# Patient Record
Sex: MALE | Race: ASIAN | NOT HISPANIC OR LATINO | URBAN - METROPOLITAN AREA
[De-identification: names, ages, dates, MRNs, and addresses within clinical notes are randomized per-mention and may not be internally consistent; named-entity substitution may affect disease eponyms.]

---

## 2018-09-18 ENCOUNTER — EMERGENCY (EMERGENCY)
Facility: HOSPITAL | Age: 28
LOS: 1 days | Discharge: HOME | End: 2018-09-18
Admitting: EMERGENCY MEDICINE
Payer: MEDICAID

## 2018-09-18 VITALS
DIASTOLIC BLOOD PRESSURE: 82 MMHG | RESPIRATION RATE: 16 BRPM | HEART RATE: 80 BPM | SYSTOLIC BLOOD PRESSURE: 126 MMHG | TEMPERATURE: 98 F | OXYGEN SATURATION: 100 %

## 2018-09-18 PROCEDURE — 99283 EMERGENCY DEPT VISIT LOW MDM: CPT | Mod: 25

## 2018-09-18 RX ADMIN — Medication 1 TABLET(S): at 01:38

## 2018-09-18 NOTE — ED PROVIDER NOTE - PMH
Bipolar disorder    Diabetes mellitus    GERD (gastroesophageal reflux disease)    Hypothyroid    Mental retardation    Psychiatric disorder  Unspecified diagnosis

## 2018-09-18 NOTE — ED PROVIDER NOTE - CARE PLAN
Principal Discharge DX:	Otitis media  Assessment and plan of treatment:	pls rest, drink plenty of fluids, take your antibiotics as instructed, tylenol/motrin as needed, f/u with your pmd, return for any worsening s ymptoms or any other concerning symptoms

## 2018-09-18 NOTE — ED PROVIDER NOTE - OBJECTIVE STATEMENT
27 y/o male pmh dm type 2 presnets with c/o b/l ear pain x 2 days, denies any headaches, neck pain, cough, f/c/n/v/d, tinnitus, hearing loss, chest pain, sob, abdominal pain,urinary symptoms, numbness/weakness/tingling, recent travel, sick contact, social history, has not taken any meds for his symptoms

## 2018-09-18 NOTE — ED PROVIDER NOTE - PLAN OF CARE
pls rest, drink plenty of fluids, take your antibiotics as instructed, tylenol/motrin as needed, f/u with your pmd, return for any worsening s ymptoms or any other concerning symptoms

## 2018-10-13 ENCOUNTER — EMERGENCY (EMERGENCY)
Facility: HOSPITAL | Age: 28
LOS: 1 days | Discharge: HOME | End: 2018-10-13
Attending: EMERGENCY MEDICINE | Admitting: EMERGENCY MEDICINE
Payer: MEDICAID

## 2018-10-13 VITALS
SYSTOLIC BLOOD PRESSURE: 126 MMHG | RESPIRATION RATE: 16 BRPM | TEMPERATURE: 98 F | DIASTOLIC BLOOD PRESSURE: 84 MMHG | OXYGEN SATURATION: 99 % | HEART RATE: 113 BPM

## 2018-10-13 PROCEDURE — 99283 EMERGENCY DEPT VISIT LOW MDM: CPT | Mod: 25

## 2018-10-13 NOTE — ED ADULT TRIAGE NOTE - CHIEF COMPLAINT QUOTE
Pt walk in c/o Right Flank pain, for 2 weeks, intermittent with Nausea Vomiting x3 today. denies Abdominal Pain, Dysuria, blood in the urine/ stool

## 2018-10-14 VITALS
HEART RATE: 110 BPM | OXYGEN SATURATION: 100 % | DIASTOLIC BLOOD PRESSURE: 90 MMHG | TEMPERATURE: 98 F | RESPIRATION RATE: 18 BRPM | SYSTOLIC BLOOD PRESSURE: 129 MMHG

## 2018-10-14 LAB
ALBUMIN SERPL ELPH-MCNC: 4.9 G/DL — SIGNIFICANT CHANGE UP (ref 3.3–5)
ALP SERPL-CCNC: 69 U/L — SIGNIFICANT CHANGE UP (ref 40–120)
ALT FLD-CCNC: 55 U/L — HIGH (ref 4–41)
AMORPH CRY # UR COMP ASSIST: SIGNIFICANT CHANGE UP (ref 0–0)
APPEARANCE UR: SIGNIFICANT CHANGE UP
AST SERPL-CCNC: 30 U/L — SIGNIFICANT CHANGE UP (ref 4–40)
BACTERIA # UR AUTO: SIGNIFICANT CHANGE UP
BASOPHILS # BLD AUTO: 0.05 K/UL — SIGNIFICANT CHANGE UP (ref 0–0.2)
BASOPHILS NFR BLD AUTO: 0.3 % — SIGNIFICANT CHANGE UP (ref 0–2)
BILIRUB SERPL-MCNC: 0.8 MG/DL — SIGNIFICANT CHANGE UP (ref 0.2–1.2)
BILIRUB UR-MCNC: NEGATIVE — SIGNIFICANT CHANGE UP
BLOOD UR QL VISUAL: NEGATIVE — SIGNIFICANT CHANGE UP
BUN SERPL-MCNC: 11 MG/DL — SIGNIFICANT CHANGE UP (ref 7–23)
CALCIUM SERPL-MCNC: 9 MG/DL — SIGNIFICANT CHANGE UP (ref 8.4–10.5)
CHLORIDE SERPL-SCNC: 95 MMOL/L — LOW (ref 98–107)
CO2 SERPL-SCNC: 28 MMOL/L — SIGNIFICANT CHANGE UP (ref 22–31)
COLOR SPEC: YELLOW — SIGNIFICANT CHANGE UP
CREAT SERPL-MCNC: 1.02 MG/DL — SIGNIFICANT CHANGE UP (ref 0.5–1.3)
EOSINOPHIL # BLD AUTO: 0.29 K/UL — SIGNIFICANT CHANGE UP (ref 0–0.5)
EOSINOPHIL NFR BLD AUTO: 1.9 % — SIGNIFICANT CHANGE UP (ref 0–6)
GLUCOSE SERPL-MCNC: 244 MG/DL — HIGH (ref 70–99)
GLUCOSE UR-MCNC: >1000 — HIGH
HCT VFR BLD CALC: 42.3 % — SIGNIFICANT CHANGE UP (ref 39–50)
HGB BLD-MCNC: 14.2 G/DL — SIGNIFICANT CHANGE UP (ref 13–17)
IMM GRANULOCYTES # BLD AUTO: 0.05 # — SIGNIFICANT CHANGE UP
IMM GRANULOCYTES NFR BLD AUTO: 0.3 % — SIGNIFICANT CHANGE UP (ref 0–1.5)
KETONES UR-MCNC: SIGNIFICANT CHANGE UP
LEUKOCYTE ESTERASE UR-ACNC: NEGATIVE — SIGNIFICANT CHANGE UP
LYMPHOCYTES # BLD AUTO: 15 % — SIGNIFICANT CHANGE UP (ref 13–44)
LYMPHOCYTES # BLD AUTO: 2.34 K/UL — SIGNIFICANT CHANGE UP (ref 1–3.3)
MCHC RBC-ENTMCNC: 29.3 PG — SIGNIFICANT CHANGE UP (ref 27–34)
MCHC RBC-ENTMCNC: 33.6 % — SIGNIFICANT CHANGE UP (ref 32–36)
MCV RBC AUTO: 87.4 FL — SIGNIFICANT CHANGE UP (ref 80–100)
MONOCYTES # BLD AUTO: 1.34 K/UL — HIGH (ref 0–0.9)
MONOCYTES NFR BLD AUTO: 8.6 % — SIGNIFICANT CHANGE UP (ref 2–14)
NEUTROPHILS # BLD AUTO: 11.57 K/UL — HIGH (ref 1.8–7.4)
NEUTROPHILS NFR BLD AUTO: 73.9 % — SIGNIFICANT CHANGE UP (ref 43–77)
NITRITE UR-MCNC: NEGATIVE — SIGNIFICANT CHANGE UP
NRBC # FLD: 0 — SIGNIFICANT CHANGE UP
PH UR: 7.5 — SIGNIFICANT CHANGE UP (ref 5–8)
PLATELET # BLD AUTO: 252 K/UL — SIGNIFICANT CHANGE UP (ref 150–400)
PMV BLD: 11.7 FL — SIGNIFICANT CHANGE UP (ref 7–13)
POTASSIUM SERPL-MCNC: 3.7 MMOL/L — SIGNIFICANT CHANGE UP (ref 3.5–5.3)
POTASSIUM SERPL-SCNC: 3.7 MMOL/L — SIGNIFICANT CHANGE UP (ref 3.5–5.3)
PROT SERPL-MCNC: 8.5 G/DL — HIGH (ref 6–8.3)
PROT UR-MCNC: 50 — SIGNIFICANT CHANGE UP
RBC # BLD: 4.84 M/UL — SIGNIFICANT CHANGE UP (ref 4.2–5.8)
RBC # FLD: 12.5 % — SIGNIFICANT CHANGE UP (ref 10.3–14.5)
RBC CASTS # UR COMP ASSIST: SIGNIFICANT CHANGE UP (ref 0–?)
SODIUM SERPL-SCNC: 139 MMOL/L — SIGNIFICANT CHANGE UP (ref 135–145)
SP GR SPEC: 1.03 — SIGNIFICANT CHANGE UP (ref 1–1.04)
UROBILINOGEN FLD QL: NORMAL — SIGNIFICANT CHANGE UP
WBC # BLD: 15.64 K/UL — HIGH (ref 3.8–10.5)
WBC # FLD AUTO: 15.64 K/UL — HIGH (ref 3.8–10.5)
WBC UR QL: SIGNIFICANT CHANGE UP (ref 0–?)

## 2018-10-14 PROCEDURE — 71046 X-RAY EXAM CHEST 2 VIEWS: CPT | Mod: 26

## 2018-10-14 RX ORDER — ACETAMINOPHEN 500 MG
650 TABLET ORAL ONCE
Qty: 0 | Refills: 0 | Status: COMPLETED | OUTPATIENT
Start: 2018-10-14 | End: 2018-10-14

## 2018-10-14 RX ADMIN — Medication 30 MILLILITER(S): at 01:42

## 2018-10-14 RX ADMIN — Medication 650 MILLIGRAM(S): at 01:42

## 2018-10-14 NOTE — ED PROVIDER NOTE - PLAN OF CARE
You were seen in the emergency department for cough. You had blood work, the results of which are attached. Please follow up with your PMD in the next 2-3 days. Return to the emergency department for any questions or concerns

## 2018-10-14 NOTE — ED PROVIDER NOTE - CARE PLAN
Principal Discharge DX:	Cough  Assessment and plan of treatment:	You were seen in the emergency department for cough. You had blood work, the results of which are attached. Please follow up with your PMD in the next 2-3 days. Return to the emergency department for any questions or concerns

## 2018-10-14 NOTE — ED ADULT NURSE NOTE - OBJECTIVE STATEMENT
Patient received in room #8 c/o nausea/vomiting and lower back pain. Patient A&OX2-3 Hx. MR. Patient denies any pain or difficulty urinating. Patient denies any abdominal pain. VS as noted. 20G IV Placed in left ac, labs drawn and sent. Will monitor.

## 2018-10-14 NOTE — ED PROVIDER NOTE - PHYSICAL EXAMINATION
General: WDWN  HEENT: EOMI, Trachea midline.   Cardiac: Normal S1 and S2 w/ RRR. No MRG.  Pulmonary: CTA bilaterally. No increased WOB.   Abdominal: Soft, NTND  Neurologic: No focal sensory or motor deficits.  Musculoskeletal: No limited ROM.  Vascular: DP pulses in BL LE 2+ and equal. WWP  Skin: Color appropriate for race.   Psychiatric: Pleasant, cooperative.   Adithya Zavala, PGY-1

## 2018-10-14 NOTE — ED PROVIDER NOTE - ATTENDING CONTRIBUTION TO CARE
Dr. Dixon: I have personally seen and examined this patient at the bedside. I have fully participated in the care of this patient. I have reviewed all pertinent clinical information, including history, physical exam, plan and the Resident's note and agree except as noted. HPI above as by me. Unremarkable PE above as by me. DDX post tussive emesis. PLAN xr, reassess, dc.

## 2018-10-14 NOTE — ED PROVIDER NOTE - OBJECTIVE STATEMENT
28M PMH DM, GERD and developmental disability. Pt reports 2 episodes of vomiting tonight. Mother is also in ER tonight. As per mom, pt and her have had a cough for past week. Tonight he had 1 episode of post-tussive vomiting. 28M PMH DM, GERD and developmental disability. Pt reports 2 episodes of vomiting tonight. Mother is also in ER tonight. As per mom, pt and her have had a cough for past week. Tonight he had 1 episode of post-tussive vomiting.    01:40 Zack att: 28M h/o dm2, developmental disability c/o vomiting. Patient states 2 episodes of vomiting tonight and atraumatic low back pain. Denies f, abd pain, dysuria. Per mom 10A today patient had coughing spell then emesis x 1. Mom denies f, d, foreign travel. Mother in ER for dry cough as well. PMH above PSH x MED metformin, gabapentin, benzotropine

## 2018-10-14 NOTE — ED PROVIDER NOTE - CONSTITUTIONAL, MLM
normal... Well appearing, well nourished, awake, alert, oriented to person, place, time/situation and in no apparent distress. Ambulatory neg exertional dyspnea.

## 2018-11-19 ENCOUNTER — EMERGENCY (EMERGENCY)
Facility: HOSPITAL | Age: 28
LOS: 1 days | Discharge: HOME | End: 2018-11-19
Attending: EMERGENCY MEDICINE | Admitting: EMERGENCY MEDICINE
Payer: MEDICAID

## 2018-11-19 VITALS
OXYGEN SATURATION: 100 % | DIASTOLIC BLOOD PRESSURE: 83 MMHG | HEART RATE: 71 BPM | SYSTOLIC BLOOD PRESSURE: 124 MMHG | TEMPERATURE: 98 F | RESPIRATION RATE: 16 BRPM

## 2018-11-19 PROCEDURE — 99283 EMERGENCY DEPT VISIT LOW MDM: CPT | Mod: 25

## 2018-11-19 NOTE — ED ADULT TRIAGE NOTE - CHIEF COMPLAINT QUOTE
C/o right ear pain x 10 days as well as a stye to his right eye. H/O DM, GERD, intellectual disability.

## 2018-11-20 VITALS
OXYGEN SATURATION: 100 % | HEART RATE: 93 BPM | SYSTOLIC BLOOD PRESSURE: 117 MMHG | RESPIRATION RATE: 16 BRPM | TEMPERATURE: 98 F | DIASTOLIC BLOOD PRESSURE: 78 MMHG

## 2018-11-20 NOTE — ED PROVIDER NOTE - ATTENDING CONTRIBUTION TO CARE
MD Whitaker:  I performed a face to face bedside interview with patient regarding history of present illness, review of symptoms and past medical history. I completed an independent physical exam(documented below).  I have discussed patient's plan of care with resident.   I agree with note as stated above, having amended the EMR as needed to reflect my findings. I have discussed the assessment and plan of care.  This includes during the time I functioned as the attending physician for this patient.  PE:  Gen: Alert, NAD  Head: NC, AT,  EOMI, normal lids/conjunctiva  ENT:  normal hearing, patent oropharynx without erythema/exudate, R ear TM is full w/ peripheral rim of erythema  Neck: +supple, no tenderness/meningismus/JVD, +Trachea midline  Chest: no chest wall tenderness, equal chest rise  Pulm: Bilateral BS, normal resp effort, no wheeze/stridor/retractions  CV: RRR, no M/R/G, +dist pulses  Abd: +BS, soft, NT/ND  Rectal: deferred  Mskel: no edema/erythema/cyanosis  Skin: no rash  Neuro: AAOx3  MDM:  29yo M w/ pmh of GERD, DM, intellectual disability, multiple "ear infections" in the past, c/o R ear pain X 1wk which feels similar to prior ear infections and right eyelid swelling. Right eyelid swelling consistent w/ hordeolum, pt to use warm compresses for this. Right ear exam consistent w/ AOM, outpt abx and pmd f/u.

## 2018-11-20 NOTE — ED PROVIDER NOTE - NS ED ROS FT
GENERAL: no fever, chills  HEENT: + right ear pain, right eyelid stye  CARDIAC: no chest pain, palpitations  PULM: no shortness of breath, cough, wheezing   GI: no abdominal pain, nausea, vomiting, diarrhea, constipation  : no dysuria, frequency  NEURO: no headache, lightheadedness  MSK: no joint or muscle pain  SKIN: no rashes  HEME: no active bleeding

## 2018-11-20 NOTE — ED PROVIDER NOTE - OBJECTIVE STATEMENT
28M with hx of DM, GERD, intellectual disability presenting with right ear pain for ~ 1 week and right eyelid stye for a few days. Patient has had AOM in the past, and states that this feels similar. Denies hearing loss, tinnitus, blurry vision, headache, n/v, throat pain, congestion, pain with eye movements, fevers, chills. Denies drainage from ears. Endorses right eyelid discomfort.

## 2018-11-20 NOTE — ED PROVIDER NOTE - NSFOLLOWUPINSTRUCTIONS_ED_ALL_ED_FT
Your diagnosis: acute otitis media, eyelid stye    Discharge instructions:    1. Please follow-up with your Primary Care Doctor in 1-2 days.    2. Take any prescribed medications as instructed: Augmentin two times a day for 7 days    3. Others: Use warm compresses to treat eyelid stye.    4. Be sure to return to the ED if you develop new or worsening symptoms. Specific signs and symptoms to be vigilant of: fever or chills, chest pain, difficulty breathing, palpitations, loss of consciousness, headache, vision changes, slurred speech, difficulty swallowing or drooling, facial droop, weakness in the arms or legs, numbness or tingling, abdominal pain, nausea or vomiting, diarrhea, constipation, blood in the stool or urine, pain on urination, difficulty urinating.

## 2018-11-20 NOTE — ED PROVIDER NOTE - MEDICAL DECISION MAKING DETAILS
28M with hx of DM, GERD, intellectual disability presenting with right ear pain for ~ 1 week and right eyelid stye for a few days. Plan - treat as AOM with antibiotics.

## 2018-11-20 NOTE — ED PROVIDER NOTE - PHYSICAL EXAMINATION
GENERAL: NAD, non-toxic appearing  HEAD: NCAT  HEENT: right eyelid stye over medial aspect; EOMI intact, no pain on movement of eyes, PERRLA; right TM slightly erythematous  CARDIAC: RRR, no murmurs  PULM: CTAB, no crackles, rales, rhonchi, or wheezing  GI: Abdomen nondistended, soft, nontender, no guarding or rebound tenderness  NEURO: AAO x 3, no focal motor or sensory deficits  MSK: Moving 4 extremities, no visible deformities  SKIN: No visible rashes, dry, well-perfused  PYSCH: Appropriate mood and affect

## 2019-01-27 ENCOUNTER — EMERGENCY (EMERGENCY)
Facility: HOSPITAL | Age: 29
LOS: 1 days | Discharge: HOME | End: 2019-01-27
Attending: EMERGENCY MEDICINE | Admitting: EMERGENCY MEDICINE
Payer: MEDICAID

## 2019-01-27 VITALS
HEART RATE: 74 BPM | DIASTOLIC BLOOD PRESSURE: 80 MMHG | SYSTOLIC BLOOD PRESSURE: 115 MMHG | OXYGEN SATURATION: 100 % | TEMPERATURE: 98 F | RESPIRATION RATE: 16 BRPM

## 2019-01-27 PROCEDURE — 99282 EMERGENCY DEPT VISIT SF MDM: CPT | Mod: 25

## 2019-01-27 NOTE — ED PROVIDER NOTE - OBJECTIVE STATEMENT
28 yr old male with hx of Dm MR presents to ed c/o few days of right ear pain. no hearing loss, no discharge, no fever, no headache, no n/v, no tinnitus. pt here with mother

## 2019-01-27 NOTE — ED PROVIDER NOTE - MEDICAL DECISION MAKING DETAILS
pt here with right ear pain exam unremarkable.    earache,  encourage to use motrin.  no concern for infection

## 2019-01-27 NOTE — ED PROVIDER NOTE - ENMT, MLM
Airway patent, Nasal mucosa clear. Mouth with normal mucosa. Throat has no vesicles, no oropharyngeal exudates and uvula is midline. bilateral ear- normal lie, no deformity, no discharge, no pain at mastoid, tm intact, no pain with manipulation except mild discomfort at right

## 2020-06-12 ENCOUNTER — EMERGENCY (EMERGENCY)
Facility: HOSPITAL | Age: 30
LOS: 1 days | Discharge: HOME | End: 2020-06-12
Attending: EMERGENCY MEDICINE | Admitting: EMERGENCY MEDICINE
Payer: SELF-PAY

## 2020-06-12 VITALS
OXYGEN SATURATION: 98 % | DIASTOLIC BLOOD PRESSURE: 82 MMHG | RESPIRATION RATE: 20 BRPM | HEART RATE: 96 BPM | SYSTOLIC BLOOD PRESSURE: 119 MMHG | TEMPERATURE: 98 F

## 2020-06-12 PROCEDURE — 99283 EMERGENCY DEPT VISIT LOW MDM: CPT

## 2020-06-12 RX ORDER — BENZTROPINE MESYLATE 1 MG
1 TABLET ORAL
Qty: 20 | Refills: 0
Start: 2020-06-12 | End: 2020-06-21

## 2020-06-12 NOTE — ED ADULT TRIAGE NOTE - CHIEF COMPLAINT QUOTE
as per mother, he usually takes his haldol 5mg and cogentin 2mg x2 days one time in morning one time at night. today he only took his haldol and forgot to take the cogentin. he took it this afternoon. however he started shaking afterwards. " mother is also asking for a refill of cogentin prescription as patient ranout     mother denies that patient took extra medication. patient complain of shaking to entire body. patient is Nepali speaking, poor historian. as per mother, he usually takes his haldol 5mg and cogentin 2mg x2 days one time in morning one time at night. today he only took his haldol and forgot to take the cogentin. he took it this afternoon. however he started shaking afterwards. " mother is also asking for a refill of cogentin prescription as patient ran out     mother denies that patient took extra medication. patient complain of shaking to entire body. patient is Maltese speaking, poor historian. Mother # [phone]- also only Maltese speaking as per mother, he usually takes his haldol 5mg and cogentin 2mg x2 days one time in morning one time at night. today he only took his haldol and forgot to take the cogentin. he took it this afternoon. however he started shaking afterwards. " mother is also asking for a refill of cogentin prescription as patient ran out     patient gabriela has half of haldol amount left in medication however is out of cogentin. but mother denies that patient took extra medication. patient complain of shaking to entire body. patient is Maltese speaking, poor historian. Mother # [phone]- also only Maltese speaking

## 2020-06-12 NOTE — ED PROVIDER NOTE - OBJECTIVE STATEMENT
History was provided by patient's sister Ms Campo. 31 y/o male with a PMHx of MR, DM, and bipolar disorder. Pt normally take Haldol twice a day and Cogentin 2 mg twice a day. Pt ran out today and he took Haldol without Cogentin and started shaking. Took one last Cogentin at 3 pm and shaking did not resolve, therefore came into ED. He was shaking in triage as per family, but is no longer shaking now. No other complaints, no N/V. Of note pt is at baseline mentally as per family.

## 2020-06-12 NOTE — ED ADULT NURSE NOTE - CHIEF COMPLAINT QUOTE
as per mother, he usually takes his haldol 5mg and cogentin 2mg x2 days one time in morning one time at night. today he only took his haldol and forgot to take the cogentin. he took it this afternoon. however he started shaking afterwards. " mother is also asking for a refill of cogentin prescription as patient ran out     patient gabriela has half of haldol amount left in medication however is out of cogentin. but mother denies that patient took extra medication. patient complain of shaking to entire body. patient is Indonesian speaking, poor historian. Mother # [phone]- also only Indonesian speaking

## 2020-06-12 NOTE — ED PROVIDER NOTE - NSFOLLOWUPINSTRUCTIONS_ED_ALL_ED_FT
Return to ED for any worsening symptoms.  10 days Rx sent to pharmacy for cogentin 2mg twice a day.  Please call your doctor for a refill.

## 2020-06-12 NOTE — ED PROVIDER NOTE - PATIENT PORTAL LINK FT
You can access the FollowMyHealth Patient Portal offered by [facility] by registering at the following website: [email or web address]. By joining [employer]’s FollowMyHealth portal, you will also be able to view your health information using other applications (apps) compatible with our system.

## 2020-06-12 NOTE — ED PROVIDER NOTE - CONSTITUTIONAL, MLM
normal... Vitals stable. Following all commands. Well appearing, awake, alert, oriented to person, place, time/situation and in no apparent distress.

## 2020-06-12 NOTE — ED ADULT NURSE NOTE - OBJECTIVE STATEMENT
Pt. is awake, alert, cooperative and able to follow commands. No s/s of distress noted. Pt. has generalized shaking which was his only complaint. VSS. No SOB, respirations are even and unlabored. MD Frausto at bedside to assess. Pt. is awake, alert, cooperative and able to follow commands. No s/s of distress noted. Pt. has generalized shaking which was his only complaint. VSS. No SOB, respirations are even and unlabored. Abdomen is soft and non-tender. Pt. unable to verbalize why he ran out of medication. MD Frausto at bedside to assess.

## 2020-06-12 NOTE — ED PROVIDER NOTE - NEUROLOGICAL, MLM
+Not able to answer some basic questions. Alert and oriented. No tremors or abnormal movements noted.

## 2020-06-12 NOTE — ED PROVIDER NOTE - CLINICAL SUMMARY MEDICAL DECISION MAKING FREE TEXT BOX
29 y/o male with possible dyskinesia that has resolved upon arrival at ED; possibly due to Cogentin. Normal neuro exam will refill Cogentin for 1.5 week, until pt can get a refill from Doctor. Sister agreeable with plan Rx sent to [facility] pharmacy.

## 2022-10-19 NOTE — ED PROVIDER NOTE - NS ED MD DISPO DISCHARGE
Pt complaining of chest pain, that started today, rectal bleeding x 2 days and back pain x 1 week.
Home

## 2024-01-18 ENCOUNTER — EMERGENCY (EMERGENCY)
Facility: HOSPITAL | Age: 34
LOS: 1 days | Discharge: HOME | End: 2024-01-18
Attending: EMERGENCY MEDICINE | Admitting: EMERGENCY MEDICINE
Payer: MEDICAID

## 2024-01-18 VITALS
RESPIRATION RATE: 18 BRPM | OXYGEN SATURATION: 99 % | DIASTOLIC BLOOD PRESSURE: 91 MMHG | HEART RATE: 115 BPM | TEMPERATURE: 98 F | SYSTOLIC BLOOD PRESSURE: 126 MMHG

## 2024-01-18 LAB
ALBUMIN SERPL ELPH-MCNC: 4.2 G/DL — SIGNIFICANT CHANGE UP (ref 3.3–5)
ALP SERPL-CCNC: 113 U/L — SIGNIFICANT CHANGE UP (ref 40–120)
ALT FLD-CCNC: 47 U/L — HIGH (ref 4–41)
ANION GAP SERPL CALC-SCNC: 13 MMOL/L — SIGNIFICANT CHANGE UP (ref 7–14)
AST SERPL-CCNC: 31 U/L — SIGNIFICANT CHANGE UP (ref 4–40)
BASOPHILS # BLD AUTO: 0.02 K/UL — SIGNIFICANT CHANGE UP (ref 0–0.2)
BASOPHILS NFR BLD AUTO: 0.3 % — SIGNIFICANT CHANGE UP (ref 0–2)
BILIRUB SERPL-MCNC: 0.7 MG/DL — SIGNIFICANT CHANGE UP (ref 0.2–1.2)
BLOOD GAS VENOUS COMPREHENSIVE RESULT: SIGNIFICANT CHANGE UP
BUN SERPL-MCNC: 11 MG/DL — SIGNIFICANT CHANGE UP (ref 7–23)
CALCIUM SERPL-MCNC: 9.1 MG/DL — SIGNIFICANT CHANGE UP (ref 8.4–10.5)
CHLORIDE SERPL-SCNC: 98 MMOL/L — SIGNIFICANT CHANGE UP (ref 98–107)
CO2 SERPL-SCNC: 24 MMOL/L — SIGNIFICANT CHANGE UP (ref 22–31)
CREAT SERPL-MCNC: 0.92 MG/DL — SIGNIFICANT CHANGE UP (ref 0.5–1.3)
EGFR: 113 ML/MIN/1.73M2 — SIGNIFICANT CHANGE UP
EOSINOPHIL # BLD AUTO: 0.31 K/UL — SIGNIFICANT CHANGE UP (ref 0–0.5)
EOSINOPHIL NFR BLD AUTO: 5.2 % — SIGNIFICANT CHANGE UP (ref 0–6)
GLUCOSE SERPL-MCNC: 349 MG/DL — HIGH (ref 70–99)
HCT VFR BLD CALC: 37.4 % — LOW (ref 39–50)
HGB BLD-MCNC: 13.2 G/DL — SIGNIFICANT CHANGE UP (ref 13–17)
IANC: 3 K/UL — SIGNIFICANT CHANGE UP (ref 1.8–7.4)
IMM GRANULOCYTES NFR BLD AUTO: 0.2 % — SIGNIFICANT CHANGE UP (ref 0–0.9)
LIDOCAIN IGE QN: 37 U/L — SIGNIFICANT CHANGE UP (ref 7–60)
LYMPHOCYTES # BLD AUTO: 2.01 K/UL — SIGNIFICANT CHANGE UP (ref 1–3.3)
LYMPHOCYTES # BLD AUTO: 33.6 % — SIGNIFICANT CHANGE UP (ref 13–44)
MCHC RBC-ENTMCNC: 29.9 PG — SIGNIFICANT CHANGE UP (ref 27–34)
MCHC RBC-ENTMCNC: 35.3 GM/DL — SIGNIFICANT CHANGE UP (ref 32–36)
MCV RBC AUTO: 84.6 FL — SIGNIFICANT CHANGE UP (ref 80–100)
MONOCYTES # BLD AUTO: 0.63 K/UL — SIGNIFICANT CHANGE UP (ref 0–0.9)
MONOCYTES NFR BLD AUTO: 10.5 % — SIGNIFICANT CHANGE UP (ref 2–14)
NEUTROPHILS # BLD AUTO: 3 K/UL — SIGNIFICANT CHANGE UP (ref 1.8–7.4)
NEUTROPHILS NFR BLD AUTO: 50.2 % — SIGNIFICANT CHANGE UP (ref 43–77)
NRBC # BLD: 0 /100 WBCS — SIGNIFICANT CHANGE UP (ref 0–0)
NRBC # FLD: 0 K/UL — SIGNIFICANT CHANGE UP (ref 0–0)
PLATELET # BLD AUTO: 233 K/UL — SIGNIFICANT CHANGE UP (ref 150–400)
POTASSIUM SERPL-MCNC: 3.9 MMOL/L — SIGNIFICANT CHANGE UP (ref 3.5–5.3)
POTASSIUM SERPL-SCNC: 3.9 MMOL/L — SIGNIFICANT CHANGE UP (ref 3.5–5.3)
PROT SERPL-MCNC: 7.2 G/DL — SIGNIFICANT CHANGE UP (ref 6–8.3)
RBC # BLD: 4.42 M/UL — SIGNIFICANT CHANGE UP (ref 4.2–5.8)
RBC # FLD: 12 % — SIGNIFICANT CHANGE UP (ref 10.3–14.5)
SODIUM SERPL-SCNC: 135 MMOL/L — SIGNIFICANT CHANGE UP (ref 135–145)
WBC # BLD: 5.98 K/UL — SIGNIFICANT CHANGE UP (ref 3.8–10.5)
WBC # FLD AUTO: 5.98 K/UL — SIGNIFICANT CHANGE UP (ref 3.8–10.5)

## 2024-01-18 PROCEDURE — 99284 EMERGENCY DEPT VISIT MOD MDM: CPT

## 2024-01-18 PROCEDURE — 71046 X-RAY EXAM CHEST 2 VIEWS: CPT | Mod: 26

## 2024-01-18 PROCEDURE — 93010 ELECTROCARDIOGRAM REPORT: CPT

## 2024-01-18 RX ORDER — FAMOTIDINE 10 MG/ML
20 INJECTION INTRAVENOUS ONCE
Refills: 0 | Status: COMPLETED | OUTPATIENT
Start: 2024-01-18 | End: 2024-01-18

## 2024-01-18 RX ORDER — METOCLOPRAMIDE HCL 10 MG
10 TABLET ORAL ONCE
Refills: 0 | Status: COMPLETED | OUTPATIENT
Start: 2024-01-18 | End: 2024-01-18

## 2024-01-18 RX ORDER — SODIUM CHLORIDE 9 MG/ML
1000 INJECTION INTRAMUSCULAR; INTRAVENOUS; SUBCUTANEOUS ONCE
Refills: 0 | Status: COMPLETED | OUTPATIENT
Start: 2024-01-18 | End: 2024-01-18

## 2024-01-18 RX ADMIN — SODIUM CHLORIDE 1000 MILLILITER(S): 9 INJECTION INTRAMUSCULAR; INTRAVENOUS; SUBCUTANEOUS at 22:28

## 2024-01-18 RX ADMIN — Medication 10 MILLIGRAM(S): at 23:01

## 2024-01-18 RX ADMIN — FAMOTIDINE 20 MILLIGRAM(S): 10 INJECTION INTRAVENOUS at 23:01

## 2024-01-18 NOTE — ED PROVIDER NOTE - NSFOLLOWUPCLINICS_GEN_ALL_ED_FT
Gastroenterology at [facility]  Gastroenterology  [address] [place], [place] [address]  Phone: [phone]  Fax:   Follow Up Time: 7-10 Days

## 2024-01-18 NOTE — ED PROVIDER NOTE - PHYSICAL EXAMINATION
Attending/Jeremy: Well-appearing, NAD; PERRL/EOMI, non-icterus, +dry mucosa, supple, no BAO, no JVD, RRR, CTAB; Abd-soft, NT/ND, no HSM; no LE edema, A&Ox3, nonfocal; Skin-warm/dry

## 2024-01-18 NOTE — ED PROVIDER NOTE - PATIENT PORTAL LINK FT
You can access the FollowMyHealth Patient Portal offered by [facility] by registering at the following website: [email or web address]. By joining [employer]’s FollowMyHealth portal, you will also be able to view your health information using other applications (apps) compatible with our system.

## 2024-01-18 NOTE — ED PROVIDER NOTE - OBJECTIVE STATEMENT
Attending/Jeremy: 31-year-old male Hebrew speaking history of DM2, MR, bipolar disorder presents with 3 days of nausea/vomiting.  He denies fever/chills, abdominal pain, change in urinary/bowel habits, weakness or lightheadedness.    LL #: [id number] Osmar Smithiaz

## 2024-01-18 NOTE — ED ADULT NURSE NOTE - OBJECTIVE STATEMENT
Pt received to 27a a/o x 3 c/o nausea,vomiting x 1 day. Pt noted to have some blood in emesis. denies any new foods. Respirations even and unlabored. Lung sounds clear with equal chest rise bilaterally. ABD is soft, non tender, non distended with normal active bowel sounds.  LBM was yesterday and normal for him, no dark stools. No complaints of chest pain, headache, dizziness,   SOB, fever, chills verbalized. 20g iv placed left AC labs drawn and sent. medication given as per order. Report given to primary nurse.

## 2024-01-18 NOTE — ED PROVIDER NOTE - NSICDXPASTMEDICALHX_GEN_ALL_CORE_FT
PAST MEDICAL HISTORY:  Bipolar disorder     Diabetes mellitus     GERD (gastroesophageal reflux disease)     Hypothyroid     Mental retardation     Psychiatric disorder Unspecified diagnosis

## 2024-01-18 NOTE — ED PROVIDER NOTE - DISCHARGE DATE
IRENE TAYLOR  71y  Female    Patient is a 71y old  Female who presents with a chief complaint of Sepsis (16 Nov 2018 11:50)    awake and alert. scheduled for transfer to [facility] for ERCP by Dr. Lexa Sin.     PAST MEDICAL & SURGICAL HISTORY:  ESRD (end stage renal disease) on dialysis: MWF  CAD (coronary artery disease): s/p stent in 2007  Diabetes  Myocardial infarction  Hypertension  H/O: hysterectomy          PHYSICAL EXAM:    T(C): 37.2 (11-16-18 @ 12:44), Max: 37.7 (11-16-18 @ 00:47)  HR: 77 (11-16-18 @ 12:44) (74 - 89)  BP: 128/70 (11-16-18 @ 12:44) (128/70 - 176/74)  RR: 19 (11-16-18 @ 12:44) (18 - 19)  SpO2: 90% (11-16-18 @ 12:44) (90% - 100%)  Wt(kg): --    I&O's Detail    15 Nov 2018 07:01  -  16 Nov 2018 07:00  --------------------------------------------------------  IN:  Total IN: 0 mL    OUT:    Other: 2000 mL    Post-Void Residual per Intermittent Catheterization: 400 mL  Total OUT: 2400 mL    Total NET: -2400 mL      16 Nov 2018 07:01  -  16 Nov 2018 13:49  --------------------------------------------------------  IN:  Total IN: 0 mL    OUT:    Emesis: 200 mL  Total OUT: 200 mL    Total NET: -200 mL          Respiratory: clear anteriorly, decreased BS at bases  Cardiovascular: S1 S2  Gastrointestinal: soft NT ND +BS  Extremities: trace edema   Neuro: Awake and alert    MEDICATIONS  (STANDING):  aspirin enteric coated 81 milliGRAM(s) Oral daily  atorvastatin 40 milliGRAM(s) Oral at bedtime  calcium carbonate 1250 mG  + Vitamin D (OsCal 500 + D) 1 Tablet(s) Oral daily  chlorhexidine 2% Cloths 1 Application(s) Topical daily  clopidogrel Tablet 75 milliGRAM(s) Oral daily  dextrose 5%. 1000 milliLiter(s) (50 mL/Hr) IV Continuous <Continuous>  dextrose 50% Injectable 12.5 Gram(s) IV Push once  dextrose 50% Injectable 25 Gram(s) IV Push once  dextrose 50% Injectable 25 Gram(s) IV Push once  docusate sodium 100 milliGRAM(s) Oral three times a day  epoetin analilia Injectable 29276 Unit(s) IV Push <User Schedule>  ertapenem  IVPB      ferrous    sulfate 325 milliGRAM(s) Oral three times a day  gabapentin 300 milliGRAM(s) Oral daily  heparin  Injectable 5000 Unit(s) SubCutaneous every 8 hours  insulin lispro (HumaLOG) corrective regimen sliding scale   SubCutaneous Before meals and at bedtime  isosorbide   mononitrate ER Tablet (IMDUR) 60 milliGRAM(s) Oral <User Schedule>  isosorbide   mononitrate ER Tablet (IMDUR) 30 milliGRAM(s) Oral every 24 hours  metoprolol tartrate 50 milliGRAM(s) Oral two times a day  NIFEdipine XL 90 milliGRAM(s) Oral daily  pantoprazole    Tablet 40 milliGRAM(s) Oral before breakfast  polyethylene glycol 3350 17 Gram(s) Oral daily  senna 2 Tablet(s) Oral at bedtime  ursodiol Capsule 300 milliGRAM(s) Oral every 12 hours    MEDICATIONS  (PRN):  acetaminophen   Tablet .. 650 milliGRAM(s) Oral every 6 hours PRN Mild Pain (1 - 3)  dextrose 40% Gel 15 Gram(s) Oral once PRN Blood Glucose LESS THAN 70 milliGRAM(s)/deciliter  glucagon  Injectable 1 milliGRAM(s) IntraMuscular once PRN Glucose LESS THAN 70 milligrams/deciliter  simethicone 80 milliGRAM(s) Chew every 6 hours PRN abdominal bloating                            8.6    21.92 )-----------( 343      ( 16 Nov 2018 08:46 )             28.3       11-16    136  |  95<L>  |  22  ----------------------------<  157<H>  4.4   |  30  |  4.50<H>    Ca    8.1<L>      16 Nov 2018 08:46    TPro  7.4  /  Alb  2.0<L>  /  TBili  2.2<H>  /  DBili  1.80<H>  /  AST  127<H>  /  ALT  50  /  AlkPhos  466<H>  11-16 19-Jan-2024

## 2024-01-18 NOTE — ED PROVIDER NOTE - CLINICAL SUMMARY MEDICAL DECISION MAKING FREE TEXT BOX
A/P   33-year-old male nausea speaking history of DM2, MR, bipolar disorder presents with 3 days of nausea/vomiting with noted coffee-ground emesis.  He denies fever/chills, abdominal pain.  Exam noted for nontender and nondistended abdomen, dry mucosa.  Plan: Screening EKG, labs including rule out DKA, H2 blockers, IV fluids, reassess

## 2024-01-18 NOTE — ED PROVIDER NOTE - NSICDXFAMILYHX_GEN_ALL_CORE_FT
FAMILY HISTORY:  Family history of diabetes mellitus  Family history of hypertension  Family history unknown

## 2024-01-19 VITALS
DIASTOLIC BLOOD PRESSURE: 86 MMHG | HEART RATE: 99 BPM | SYSTOLIC BLOOD PRESSURE: 131 MMHG | RESPIRATION RATE: 18 BRPM | TEMPERATURE: 98 F | OXYGEN SATURATION: 99 %

## 2024-03-30 ENCOUNTER — EMERGENCY (EMERGENCY)
Facility: HOSPITAL | Age: 34
LOS: 1 days | Discharge: HOME | End: 2024-03-30
Attending: STUDENT IN AN ORGANIZED HEALTH CARE EDUCATION/TRAINING PROGRAM | Admitting: STUDENT IN AN ORGANIZED HEALTH CARE EDUCATION/TRAINING PROGRAM
Payer: MEDICAID

## 2024-03-30 VITALS
TEMPERATURE: 98 F | HEART RATE: 104 BPM | SYSTOLIC BLOOD PRESSURE: 115 MMHG | OXYGEN SATURATION: 100 % | RESPIRATION RATE: 18 BRPM | DIASTOLIC BLOOD PRESSURE: 78 MMHG

## 2024-03-30 PROCEDURE — 99285 EMERGENCY DEPT VISIT HI MDM: CPT

## 2024-03-30 NOTE — ED ADULT TRIAGE NOTE - CHIEF COMPLAINT QUOTE
Pt c/o chest pain, back pain, b/l rib cage pain x1 day. Hx DM2, mental retardation, hypothyroid, bipolar

## 2024-03-31 VITALS
HEART RATE: 99 BPM | SYSTOLIC BLOOD PRESSURE: 125 MMHG | OXYGEN SATURATION: 100 % | DIASTOLIC BLOOD PRESSURE: 89 MMHG | RESPIRATION RATE: 18 BRPM | TEMPERATURE: 98 F

## 2024-03-31 LAB
ALBUMIN SERPL ELPH-MCNC: 4 G/DL — SIGNIFICANT CHANGE UP (ref 3.3–5)
ALP SERPL-CCNC: 107 U/L — SIGNIFICANT CHANGE UP (ref 40–120)
ALT FLD-CCNC: 22 U/L — SIGNIFICANT CHANGE UP (ref 4–41)
ANION GAP SERPL CALC-SCNC: 11 MMOL/L — SIGNIFICANT CHANGE UP (ref 7–14)
APPEARANCE UR: CLEAR — SIGNIFICANT CHANGE UP
APTT BLD: 28.9 SEC — SIGNIFICANT CHANGE UP (ref 24.5–35.6)
AST SERPL-CCNC: 22 U/L — SIGNIFICANT CHANGE UP (ref 4–40)
BASOPHILS # BLD AUTO: 0.04 K/UL — SIGNIFICANT CHANGE UP (ref 0–0.2)
BASOPHILS NFR BLD AUTO: 0.7 % — SIGNIFICANT CHANGE UP (ref 0–2)
BILIRUB SERPL-MCNC: 0.5 MG/DL — SIGNIFICANT CHANGE UP (ref 0.2–1.2)
BILIRUB UR-MCNC: NEGATIVE — SIGNIFICANT CHANGE UP
BUN SERPL-MCNC: 5 MG/DL — LOW (ref 7–23)
CALCIUM SERPL-MCNC: 8.7 MG/DL — SIGNIFICANT CHANGE UP (ref 8.4–10.5)
CHLORIDE SERPL-SCNC: 95 MMOL/L — LOW (ref 98–107)
CO2 SERPL-SCNC: 26 MMOL/L — SIGNIFICANT CHANGE UP (ref 22–31)
COLOR SPEC: YELLOW — SIGNIFICANT CHANGE UP
CREAT SERPL-MCNC: 0.77 MG/DL — SIGNIFICANT CHANGE UP (ref 0.5–1.3)
D DIMER BLD IA.RAPID-MCNC: <150 NG/ML DDU — SIGNIFICANT CHANGE UP
DIFF PNL FLD: NEGATIVE — SIGNIFICANT CHANGE UP
EGFR: 122 ML/MIN/1.73M2 — SIGNIFICANT CHANGE UP
EOSINOPHIL # BLD AUTO: 0.41 K/UL — SIGNIFICANT CHANGE UP (ref 0–0.5)
EOSINOPHIL NFR BLD AUTO: 6.8 % — HIGH (ref 0–6)
FLUAV AG NPH QL: SIGNIFICANT CHANGE UP
FLUBV AG NPH QL: SIGNIFICANT CHANGE UP
GLUCOSE SERPL-MCNC: 304 MG/DL — HIGH (ref 70–99)
GLUCOSE UR QL: >=1000 MG/DL
HCT VFR BLD CALC: 38.5 % — LOW (ref 39–50)
HGB BLD-MCNC: 13.2 G/DL — SIGNIFICANT CHANGE UP (ref 13–17)
IANC: 3.22 K/UL — SIGNIFICANT CHANGE UP (ref 1.8–7.4)
IMM GRANULOCYTES NFR BLD AUTO: 0.2 % — SIGNIFICANT CHANGE UP (ref 0–0.9)
INR BLD: <0.9 RATIO — SIGNIFICANT CHANGE UP (ref 0.85–1.18)
KETONES UR-MCNC: NEGATIVE MG/DL — SIGNIFICANT CHANGE UP
LEUKOCYTE ESTERASE UR-ACNC: NEGATIVE — SIGNIFICANT CHANGE UP
LIDOCAIN IGE QN: 71 U/L — HIGH (ref 7–60)
LYMPHOCYTES # BLD AUTO: 1.51 K/UL — SIGNIFICANT CHANGE UP (ref 1–3.3)
LYMPHOCYTES # BLD AUTO: 25 % — SIGNIFICANT CHANGE UP (ref 13–44)
MAGNESIUM SERPL-MCNC: 1.8 MG/DL — SIGNIFICANT CHANGE UP (ref 1.6–2.6)
MCHC RBC-ENTMCNC: 29 PG — SIGNIFICANT CHANGE UP (ref 27–34)
MCHC RBC-ENTMCNC: 34.3 GM/DL — SIGNIFICANT CHANGE UP (ref 32–36)
MCV RBC AUTO: 84.6 FL — SIGNIFICANT CHANGE UP (ref 80–100)
MONOCYTES # BLD AUTO: 0.86 K/UL — SIGNIFICANT CHANGE UP (ref 0–0.9)
MONOCYTES NFR BLD AUTO: 14.2 % — HIGH (ref 2–14)
NEUTROPHILS # BLD AUTO: 3.22 K/UL — SIGNIFICANT CHANGE UP (ref 1.8–7.4)
NEUTROPHILS NFR BLD AUTO: 53.1 % — SIGNIFICANT CHANGE UP (ref 43–77)
NITRITE UR-MCNC: NEGATIVE — SIGNIFICANT CHANGE UP
NRBC # BLD: 0 /100 WBCS — SIGNIFICANT CHANGE UP (ref 0–0)
NRBC # FLD: 0 K/UL — SIGNIFICANT CHANGE UP (ref 0–0)
PH UR: 7 — SIGNIFICANT CHANGE UP (ref 5–8)
PLATELET # BLD AUTO: 236 K/UL — SIGNIFICANT CHANGE UP (ref 150–400)
POTASSIUM SERPL-MCNC: 3.7 MMOL/L — SIGNIFICANT CHANGE UP (ref 3.5–5.3)
POTASSIUM SERPL-SCNC: 3.7 MMOL/L — SIGNIFICANT CHANGE UP (ref 3.5–5.3)
PROT SERPL-MCNC: 6.8 G/DL — SIGNIFICANT CHANGE UP (ref 6–8.3)
PROT UR-MCNC: NEGATIVE MG/DL — SIGNIFICANT CHANGE UP
PROTHROM AB SERPL-ACNC: 10 SEC — SIGNIFICANT CHANGE UP (ref 9.5–13)
RBC # BLD: 4.55 M/UL — SIGNIFICANT CHANGE UP (ref 4.2–5.8)
RBC # FLD: 11.7 % — SIGNIFICANT CHANGE UP (ref 10.3–14.5)
RSV RNA NPH QL NAA+NON-PROBE: SIGNIFICANT CHANGE UP
SARS-COV-2 RNA SPEC QL NAA+PROBE: SIGNIFICANT CHANGE UP
SODIUM SERPL-SCNC: 132 MMOL/L — LOW (ref 135–145)
SP GR SPEC: 1.01 — SIGNIFICANT CHANGE UP (ref 1–1.03)
TROPONIN T, HIGH SENSITIVITY RESULT: <6 NG/L — SIGNIFICANT CHANGE UP
UROBILINOGEN FLD QL: 0.2 MG/DL — SIGNIFICANT CHANGE UP (ref 0.2–1)
WBC # BLD: 6.05 K/UL — SIGNIFICANT CHANGE UP (ref 3.8–10.5)
WBC # FLD AUTO: 6.05 K/UL — SIGNIFICANT CHANGE UP (ref 3.8–10.5)

## 2024-03-31 PROCEDURE — 93010 ELECTROCARDIOGRAM REPORT: CPT

## 2024-03-31 PROCEDURE — 71046 X-RAY EXAM CHEST 2 VIEWS: CPT | Mod: 26

## 2024-03-31 RX ORDER — SODIUM CHLORIDE 9 MG/ML
1000 INJECTION INTRAMUSCULAR; INTRAVENOUS; SUBCUTANEOUS ONCE
Refills: 0 | Status: COMPLETED | OUTPATIENT
Start: 2024-03-31 | End: 2024-03-31

## 2024-03-31 RX ORDER — ACETAMINOPHEN 500 MG
1000 TABLET ORAL ONCE
Refills: 0 | Status: COMPLETED | OUTPATIENT
Start: 2024-03-31 | End: 2024-03-31

## 2024-03-31 RX ADMIN — SODIUM CHLORIDE 1000 MILLILITER(S): 9 INJECTION INTRAMUSCULAR; INTRAVENOUS; SUBCUTANEOUS at 01:24

## 2024-03-31 RX ADMIN — Medication 400 MILLIGRAM(S): at 01:24

## 2024-03-31 NOTE — ED ADULT NURSE NOTE - OBJECTIVE STATEMENT
Break coverage RN: 34 y/o male, a&ox4, ambulatory, received to rm 6A. Pt c/o left sided chest pain, radiating to the left arm, and both sides of his back. Pt is a poor historian, unsure how long the pain has been going on. Skin intact, no signs of injury or deformities noted to upper extremities. Past medical history of MR, DM type 2, non-insulin dependent, and bipolar disorder. Denies CP, SOB, or dyspnea at this time. Respirations are even and unlabored, no signs of respiratory distress. 20GIV placed to right forearm, labs collected and sent off. Pt medicated as per MD orders.

## 2024-03-31 NOTE — ED ADULT NURSE NOTE - PAIN: PRESENCE, MLM
Dyspnea





- HISTORIAN


Historian: patient





- HPI


Stated Complaint: soa


Chief Complaint: Dyspnea


Additional Information: 





Patient presents to ED with a 2 day history of increasing shortness of breath 

and cough with yellow/green sputum production.  Patient wears oxygen (2 liters) 

at night.  He has not had to wear his oxygen during the day.  Patient reports 

some substernal chest pain when he gets really short of breath.  He has been u

sing his inhalers more than usual with little relief.  Denies fever, chills, 

night sweats or syncope.  Upon arrival to ED SaO2 is 86% on room air.  Patient 

has a history of bradycardia (s/p pacer), cardiomyopathy LVEF 25%, CAD, COPD, 

cigarette use.  Patient reports having Cardiac cath about 2 weeks ago showing 

nonoccluisve CAD.  


Onset: days ago (2)


Duration: continues in ED


Initiating Event: upper respiratory illness


Severity: moderate


Exacerbated By: coughing


Associated Symptoms: chest pain, productive cough.  denies: chills, fever, calf 

pain





- ROS


CONST: no problems


EYES/ENT: denies: sore throat, nasal drainage


GI/: denies: abdominal pain, vomiting, nausea, diarrhea


NEURO/PSYCH: denies: headache


MS/SKIN/LYMPH: denies: muscle aches





- PAST HX


Lung Disease: COPD


Cardiac Disease: CHF (LVEF 25%), CAD, other (pacemaker)


PE Risk Factors: hypertension.  denies: hx of PE


Surgeries/Procedures: denies: prior intubation


Allergies/Adverse Reactions: 


                                    Allergies











Allergy/AdvReac Type Severity Reaction Status Date / Time


 


No Known Drug Allergies Allergy   Verified 11/23/18 23:37














Home Medications: 


                                Ambulatory Orders











 Medication  Instructions  Recorded


 


Carvedilol [Coreg] 3.125 mg PO DAILY 08/03/16


 


Diclofenac Sodium [Voltaren] 50 mg PO BID 05/25/18


 


Lisinopril 5 mg PO DAILY 05/25/18


 


Omeprazole 20 mg PO DAILY 05/25/18


 


Ipratropium/Albuterol Sulfate 3 ml IH TID #90 ampul.neb 11/24/18





[Duoneb]  


 


Nebulizer [Aeroeclipse II] 1 each MC DAILY #1 each 11/24/18














- SOCIAL HX


Smoking History: cigarettes, less than 1 pack/day


Alcohol Use: none


Drug Use: none





- FAMILY HX


Family History: none





- VITAL SIGNS


Vital Signs: 





                                   Vital Signs











Temp Pulse Resp BP Pulse Ox


 


          110/68    


 


          05/25/18 15:15   














- REVIEWED ASSESSMENTS


Nursing Assessment  Reviewed: Yes


Vitals Reviewed: Yes





Progress





- Progress


Progress: 





015  Discussed CXR results and elevated WBC with patient.  Patient agrees to 

admission.  





- EKG/XRAY/CT


Comments: sinus rhythm 99 bpm left bbb





ED Results Lab/Radiology





- Radiology


Radiology Impressions: 





Portable chest 


Clinical history:  Shortness of breath for 2 days. 


Findings:  Examination of the chest in single portable AP view with comparison 

to examination of 05/25/2018 demonstrates lungs to be hyperinflated but clear.  

Bipolar pacemaker overlies left hemithorax.  Central pulmonary arteries are 

prominent suggesting pulmonary arterial hypertension. 


Impression: 


1. Emphysema. 


2. Prominence of central pulmonary arteries suggesting pulmonary arterial 

hypertension.


 


Electronically signed on Nov 23, 2018 11:58:33 PM CST by:


Bijan Mills





My interpretation is bilateral pneumonia, especially with elevated WBC and 

copious green sputum production. 





- Orders


Orders: 





                                    ED Orders











 Category Date Time Status


 


 Place IV Lock 1T Care  11/23/18 23:24 Ordered


 


 CHEST 1VIEW [RAD] Stat Exams  11/23/18 Ordered


 


 CBC/PLATELET/DIFF Routine Lab  11/23/18 Ordered


 


 CMP Routine Lab  11/23/18 Ordered


 


 NT-proBNP Stat Lab  11/23/18 Ordered


 


 TROPONIN I (cTnI) Stat Lab  11/23/18 Ordered


 


 Ipratropium/Albuterol Sulfate [Duoneb] Med  11/23/18 23:25 Once





 3 ml NEB NOW ONE   


 


 EKG WITH COMPARISON Stat Ther  11/23/18 Ordered














Dyspnea Physical Exam





- EXAM


General Appearance: no acute distress, alert


EENT: JOSE


Neck: No: lymphadenopathy


Respiratory: no resp. distress, speaks full sentences, other (markedly 

diminished breath sounds bilaterally)


CVS: reg. rate & rhythm, no murmur


Abdomen: non-tender.  No: tenderness


Skin: color nml, no rash


Extremities: non-tender, no edema


Neuro/Psych: oriented x3, motor nml





Discharge


Clincal Impression: 


 Acute respiratory failure with hypoxia





Bilateral pneumonia


Qualifiers:


 Pneumonia type: due to unspecified organism Lung location: lower lobe of lung 

Qualified Code(s): J18.1 - Lobar pneumonia, unspecified organism





Prescriptions: 


Ipratropium/Albuterol Sulfate [Duoneb] 3 ml IH TID #90 ampul.neb


Nebulizer [Aeroeclipse II] 1 each MC DAILY #1 each


Referrals: 


Griselda Dong PRN [Primary Care Provider] - 2 Days


Condition: Stable


Disposition: 09 ADMITTED AS INPATIENT


Decision to Admit: [id number]


Date of Decison to Admit: 11/24/18


Decision Time: 00:35
complains of pain/discomfort

## 2024-03-31 NOTE — ED PROVIDER NOTE - ATTENDING CONTRIBUTION TO CARE
33-year-old male, history of intellectual disability and type 2 diabetes, presenting with chief complaint of chest pain.  Ongoing for 2 to 3 weeks. nonradiating.  Has associated pain over his back, diffusely.  Denies any neurologic symptoms generally.  The history is limited because the patient does not seem to understand my questions secondary to his intellectual disability.  His mother does accompany him, [facility]  used to communicate with her.  However she also has somewhat tangential responses to questions from the .  No known allergies to medications.  He is on an oral antihyperglycemic which they cannot specify.  To the best of the mother's knowledge, the son does not use IV drugs, does not have a history of back surgeries.\  agree with above: pt complaining of lower back pain and whole body pain including left chest. had vomiting yesterday. has been ongoing for several days but has not taken anything for pain, no fevers, chills, abd pain, urinary sx, cough or fevers.   pt well appearing. no abd tenderness, lungs clear,   ekg nsr @96, no acute ischemic changes   pt improved with tylenol, likely msk pain,   will check labs, trop, cxr for chest pain, ua ro UTI with lower back pain

## 2024-03-31 NOTE — ED PROVIDER NOTE - PATIENT PORTAL LINK FT
You can access the FollowMyHealth Patient Portal offered by [facility] by registering at the following website: [email or web address]. By joining [facility]’s FollowMyHealth portal, you will also be able to view your health information using other applications (apps) compatible with our system.

## 2024-03-31 NOTE — ED PROVIDER NOTE - PHYSICAL EXAMINATION
Gen: NAD, non-toxic appearing  Head: normal appearing  HEENT: normal conjunctiva  Lung: no respiratory distress, speaking in full sentences, ctab   CV: regular rate and rhythm, no murmurs  Abd: soft, non distended, non tender   MSK: no visible deformities,  No swelling or calf tenderness.  Neuro: alert and grossly oriented,  he has intact strength throughout his lower extremities, he stands without difficulty.  Palpation over the back does not demonstrate any midline tenderness.  The soft tissue over the back does not look visually unremarkable.  Skin: No kristi rashes

## 2024-03-31 NOTE — ED PROVIDER NOTE - OBJECTIVE STATEMENT
limited hx, [facility]  used [id number]    33-year-old male, history of intellectual disability and type 2 diabetes, presenting with chief complaint of chest pain.  Ongoing for 2 to 3 weeks. nonradiating.  Has associated pain over his back, diffusely.  Denies any neurologic symptoms generally.  The history is limited because the patient does not seem to understand my questions secondary to his intellectual disability.  His mother does accompany him, [facility]  used to communicate with her.  However she also has somewhat tangential responses to questions from the .  No known allergies to medications.  He is on an oral antihyperglycemic which they cannot specify.  To the best of the mother's knowledge, the son does not use IV drugs, does not have a history of back surgeries.

## 2024-03-31 NOTE — ED ADULT NURSE REASSESSMENT NOTE - NS ED NURSE REASSESS COMMENT FT1
Received report from karlee Daniels. Pt is A&Ox4, not in acute distress, denies pain at this time. Respirations are even and unlabored, NSR on monitor. IV is patent and intact, no redness observed at site. Bed in lowest position, comfort measures provided, safety maintained.

## 2024-03-31 NOTE — ED PROVIDER NOTE - PROGRESS NOTE DETAILS
Rogerio Godinez MD: Work up negative for e/o critical/emergent pathology. Patient symptoms improved while in the ED. VSS compared to arrival. Is at functional baseline and able to tolerate PO food/fluids. Plan = discharge w/ appropriate follow up and outpatient tx for symptom management. Patient happy and agreeable w/ this plan. See discharge instructions for further details.

## 2024-03-31 NOTE — ED PROVIDER NOTE - NSFOLLOWUPINSTRUCTIONS_ED_ALL_ED_FT
Follow up with your Endocrinologist. Call them as soon as possible for a close follow up appointment. Call the Emergency Department if you have difficulties getting your appointment.     Immediately return to the Emergency Department for any new or markedly worsening symptoms.

## 2024-03-31 NOTE — ED PROVIDER NOTE - CLINICAL SUMMARY MEDICAL DECISION MAKING FREE TEXT BOX
Rogerio Godinez MD (PGY-3) 33-year-old male, history of intellectual disability and type 2 diabetes, presenting with chief complaint of chest pain.   Vital signs are unremarkable.  Physical exam is nonfocal.  The clinical assessment is very limited due to cultural and language barriers, as well as secondary to the patient's intellectual disability.  Will rule out acute cardiac injury, pulmonary embolism.  There is no overt signs for aortic dissection.  There is no abdominal tenderness or marked GI symptomology or major surgical risk factors to suggest intra-abdominal process as cause of chest pain.  Will assess for evidence of pancreatitis.  Patient also has back pain, without elicitation of red flags on history.  He is neurologically intact throughout his lower extremities and is able to stand without difficulty.  He also has no midline vertebral tenderness.  My concern for acute vertebral fracture, acute cord syndrome or cauda equina syndrome at this point is low.  Will obtain troponin, D-dimer, chest x-ray, lipase, CMP, CBC. pain control.

## 2024-04-01 LAB
CULTURE RESULTS: SIGNIFICANT CHANGE UP
SPECIMEN SOURCE: SIGNIFICANT CHANGE UP

## 2024-04-07 ENCOUNTER — INPATIENT (INPATIENT)
Facility: HOSPITAL | Age: 34
LOS: 1 days | Discharge: HOME HEALTH | End: 2024-04-09
Attending: INTERNAL MEDICINE | Admitting: INTERNAL MEDICINE
Payer: MEDICAID

## 2024-04-07 VITALS
DIASTOLIC BLOOD PRESSURE: 94 MMHG | RESPIRATION RATE: 16 BRPM | HEART RATE: 115 BPM | SYSTOLIC BLOOD PRESSURE: 138 MMHG | OXYGEN SATURATION: 98 % | TEMPERATURE: 98 F

## 2024-04-07 PROCEDURE — 99285 EMERGENCY DEPT VISIT HI MDM: CPT

## 2024-04-07 NOTE — ED ADULT TRIAGE NOTE - CHIEF COMPLAINT QUOTE
Patient c/o facial droop since last night. Pt. with asymmetrical eyebrow raise. No HA/slurred speech/weakness. No CP/SOB. PMH DM, MR, hypothyroid. No code stroke as per MD Otero.

## 2024-04-08 DIAGNOSIS — R29.810 FACIAL WEAKNESS: ICD-10-CM

## 2024-04-08 DIAGNOSIS — Z79.899 OTHER LONG TERM (CURRENT) DRUG THERAPY: ICD-10-CM

## 2024-04-08 DIAGNOSIS — E03.9 HYPOTHYROIDISM, UNSPECIFIED: ICD-10-CM

## 2024-04-08 DIAGNOSIS — E11.65 TYPE 2 DIABETES MELLITUS WITH HYPERGLYCEMIA: ICD-10-CM

## 2024-04-08 DIAGNOSIS — F31.9 BIPOLAR DISORDER, UNSPECIFIED: ICD-10-CM

## 2024-04-08 DIAGNOSIS — Z29.9 ENCOUNTER FOR PROPHYLACTIC MEASURES, UNSPECIFIED: ICD-10-CM

## 2024-04-08 DIAGNOSIS — E11.9 TYPE 2 DIABETES MELLITUS WITHOUT COMPLICATIONS: ICD-10-CM

## 2024-04-08 LAB
A1C WITH ESTIMATED AVERAGE GLUCOSE RESULT: 10.1 % — HIGH (ref 4–5.6)
A1C WITH ESTIMATED AVERAGE GLUCOSE RESULT: 10.3 % — HIGH (ref 4–5.6)
ALBUMIN SERPL ELPH-MCNC: 4.5 G/DL — SIGNIFICANT CHANGE UP (ref 3.3–5)
ALP SERPL-CCNC: 129 U/L — HIGH (ref 40–120)
ALT FLD-CCNC: 31 U/L — SIGNIFICANT CHANGE UP (ref 4–41)
ANION GAP SERPL CALC-SCNC: 13 MMOL/L — SIGNIFICANT CHANGE UP (ref 7–14)
ANION GAP SERPL CALC-SCNC: 15 MMOL/L — HIGH (ref 7–14)
AST SERPL-CCNC: 28 U/L — SIGNIFICANT CHANGE UP (ref 4–40)
BASOPHILS # BLD AUTO: 0.03 K/UL — SIGNIFICANT CHANGE UP (ref 0–0.2)
BASOPHILS NFR BLD AUTO: 0.5 % — SIGNIFICANT CHANGE UP (ref 0–2)
BILIRUB SERPL-MCNC: 0.6 MG/DL — SIGNIFICANT CHANGE UP (ref 0.2–1.2)
BUN SERPL-MCNC: 5 MG/DL — LOW (ref 7–23)
BUN SERPL-MCNC: 7 MG/DL — SIGNIFICANT CHANGE UP (ref 7–23)
CALCIUM SERPL-MCNC: 10.4 MG/DL — SIGNIFICANT CHANGE UP (ref 8.4–10.5)
CALCIUM SERPL-MCNC: 9.3 MG/DL — SIGNIFICANT CHANGE UP (ref 8.4–10.5)
CHLORIDE SERPL-SCNC: 93 MMOL/L — LOW (ref 98–107)
CHLORIDE SERPL-SCNC: 95 MMOL/L — LOW (ref 98–107)
CO2 SERPL-SCNC: 23 MMOL/L — SIGNIFICANT CHANGE UP (ref 22–31)
CO2 SERPL-SCNC: 24 MMOL/L — SIGNIFICANT CHANGE UP (ref 22–31)
CREAT SERPL-MCNC: 0.72 MG/DL — SIGNIFICANT CHANGE UP (ref 0.5–1.3)
CREAT SERPL-MCNC: 0.82 MG/DL — SIGNIFICANT CHANGE UP (ref 0.5–1.3)
EGFR: 120 ML/MIN/1.73M2 — SIGNIFICANT CHANGE UP
EGFR: 124 ML/MIN/1.73M2 — SIGNIFICANT CHANGE UP
EOSINOPHIL # BLD AUTO: 0.18 K/UL — SIGNIFICANT CHANGE UP (ref 0–0.5)
EOSINOPHIL NFR BLD AUTO: 2.8 % — SIGNIFICANT CHANGE UP (ref 0–6)
ESTIMATED AVERAGE GLUCOSE: 243 — SIGNIFICANT CHANGE UP
ESTIMATED AVERAGE GLUCOSE: 249 — SIGNIFICANT CHANGE UP
GLUCOSE BLDC GLUCOMTR-MCNC: 135 MG/DL — HIGH (ref 70–99)
GLUCOSE BLDC GLUCOMTR-MCNC: 205 MG/DL — HIGH (ref 70–99)
GLUCOSE BLDC GLUCOMTR-MCNC: 307 MG/DL — HIGH (ref 70–99)
GLUCOSE BLDC GLUCOMTR-MCNC: 339 MG/DL — HIGH (ref 70–99)
GLUCOSE BLDC GLUCOMTR-MCNC: 384 MG/DL — HIGH (ref 70–99)
GLUCOSE BLDC GLUCOMTR-MCNC: 397 MG/DL — HIGH (ref 70–99)
GLUCOSE SERPL-MCNC: 229 MG/DL — HIGH (ref 70–99)
GLUCOSE SERPL-MCNC: 426 MG/DL — HIGH (ref 70–99)
HCT VFR BLD CALC: 38.5 % — LOW (ref 39–50)
HGB BLD-MCNC: 13.9 G/DL — SIGNIFICANT CHANGE UP (ref 13–17)
IANC: 4.11 K/UL — SIGNIFICANT CHANGE UP (ref 1.8–7.4)
IMM GRANULOCYTES NFR BLD AUTO: 0.3 % — SIGNIFICANT CHANGE UP (ref 0–0.9)
LYMPHOCYTES # BLD AUTO: 1.41 K/UL — SIGNIFICANT CHANGE UP (ref 1–3.3)
LYMPHOCYTES # BLD AUTO: 21.8 % — SIGNIFICANT CHANGE UP (ref 13–44)
MCHC RBC-ENTMCNC: 30.1 PG — SIGNIFICANT CHANGE UP (ref 27–34)
MCHC RBC-ENTMCNC: 36.1 GM/DL — HIGH (ref 32–36)
MCV RBC AUTO: 83.3 FL — SIGNIFICANT CHANGE UP (ref 80–100)
MONOCYTES # BLD AUTO: 0.72 K/UL — SIGNIFICANT CHANGE UP (ref 0–0.9)
MONOCYTES NFR BLD AUTO: 11.1 % — SIGNIFICANT CHANGE UP (ref 2–14)
NEUTROPHILS # BLD AUTO: 4.11 K/UL — SIGNIFICANT CHANGE UP (ref 1.8–7.4)
NEUTROPHILS NFR BLD AUTO: 63.5 % — SIGNIFICANT CHANGE UP (ref 43–77)
NRBC # BLD: 0 /100 WBCS — SIGNIFICANT CHANGE UP (ref 0–0)
NRBC # FLD: 0 K/UL — SIGNIFICANT CHANGE UP (ref 0–0)
PLATELET # BLD AUTO: 290 K/UL — SIGNIFICANT CHANGE UP (ref 150–400)
POTASSIUM SERPL-MCNC: 4.2 MMOL/L — SIGNIFICANT CHANGE UP (ref 3.5–5.3)
POTASSIUM SERPL-MCNC: 4.7 MMOL/L — SIGNIFICANT CHANGE UP (ref 3.5–5.3)
POTASSIUM SERPL-SCNC: 4.2 MMOL/L — SIGNIFICANT CHANGE UP (ref 3.5–5.3)
POTASSIUM SERPL-SCNC: 4.7 MMOL/L — SIGNIFICANT CHANGE UP (ref 3.5–5.3)
PROT SERPL-MCNC: 7.9 G/DL — SIGNIFICANT CHANGE UP (ref 6–8.3)
RBC # BLD: 4.62 M/UL — SIGNIFICANT CHANGE UP (ref 4.2–5.8)
RBC # FLD: 11.7 % — SIGNIFICANT CHANGE UP (ref 10.3–14.5)
SODIUM SERPL-SCNC: 131 MMOL/L — LOW (ref 135–145)
SODIUM SERPL-SCNC: 132 MMOL/L — LOW (ref 135–145)
WBC # BLD: 6.47 K/UL — SIGNIFICANT CHANGE UP (ref 3.8–10.5)
WBC # FLD AUTO: 6.47 K/UL — SIGNIFICANT CHANGE UP (ref 3.8–10.5)

## 2024-04-08 PROCEDURE — 99255 IP/OBS CONSLTJ NEW/EST HI 80: CPT

## 2024-04-08 PROCEDURE — 99223 1ST HOSP IP/OBS HIGH 75: CPT

## 2024-04-08 PROCEDURE — 70450 CT HEAD/BRAIN W/O DYE: CPT | Mod: 26

## 2024-04-08 RX ORDER — HALOPERIDOL DECANOATE 100 MG/ML
5 INJECTION INTRAMUSCULAR
Refills: 0 | Status: DISCONTINUED | OUTPATIENT
Start: 2024-04-08 | End: 2024-04-09

## 2024-04-08 RX ORDER — INSULIN LISPRO 100/ML
VIAL (ML) SUBCUTANEOUS AT BEDTIME
Refills: 0 | Status: DISCONTINUED | OUTPATIENT
Start: 2024-04-08 | End: 2024-04-09

## 2024-04-08 RX ORDER — INSULIN GLARGINE 100 [IU]/ML
12 INJECTION, SOLUTION SUBCUTANEOUS AT BEDTIME
Refills: 0 | Status: DISCONTINUED | OUTPATIENT
Start: 2024-04-08 | End: 2024-04-08

## 2024-04-08 RX ORDER — DEXTROSE 50 % IN WATER 50 %
15 SYRINGE (ML) INTRAVENOUS ONCE
Refills: 0 | Status: DISCONTINUED | OUTPATIENT
Start: 2024-04-08 | End: 2024-04-09

## 2024-04-08 RX ORDER — INSULIN GLARGINE 100 [IU]/ML
12 INJECTION, SOLUTION SUBCUTANEOUS AT BEDTIME
Refills: 0 | Status: DISCONTINUED | OUTPATIENT
Start: 2024-04-08 | End: 2024-04-09

## 2024-04-08 RX ORDER — VALACYCLOVIR 500 MG/1
1000 TABLET, FILM COATED ORAL THREE TIMES A DAY
Refills: 0 | Status: DISCONTINUED | OUTPATIENT
Start: 2024-04-08 | End: 2024-04-09

## 2024-04-08 RX ORDER — BENZTROPINE MESYLATE 1 MG
1 TABLET ORAL
Refills: 0 | DISCHARGE

## 2024-04-08 RX ORDER — KETOCONAZOLE 20 MG/G
1 AEROSOL, FOAM TOPICAL
Refills: 0 | DISCHARGE

## 2024-04-08 RX ORDER — SEMAGLUTIDE 0.68 MG/ML
1 INJECTION, SOLUTION SUBCUTANEOUS
Refills: 0 | DISCHARGE

## 2024-04-08 RX ORDER — INSULIN LISPRO 100/ML
VIAL (ML) SUBCUTANEOUS
Refills: 0 | Status: DISCONTINUED | OUTPATIENT
Start: 2024-04-08 | End: 2024-04-08

## 2024-04-08 RX ORDER — DEXTROSE 50 % IN WATER 50 %
25 SYRINGE (ML) INTRAVENOUS ONCE
Refills: 0 | Status: DISCONTINUED | OUTPATIENT
Start: 2024-04-08 | End: 2024-04-09

## 2024-04-08 RX ORDER — HALOPERIDOL DECANOATE 100 MG/ML
1 INJECTION INTRAMUSCULAR
Refills: 0 | DISCHARGE

## 2024-04-08 RX ORDER — GLUCAGON INJECTION, SOLUTION 0.5 MG/.1ML
1 INJECTION, SOLUTION SUBCUTANEOUS ONCE
Refills: 0 | Status: DISCONTINUED | OUTPATIENT
Start: 2024-04-08 | End: 2024-04-09

## 2024-04-08 RX ORDER — SODIUM CHLORIDE 9 MG/ML
1000 INJECTION, SOLUTION INTRAVENOUS ONCE
Refills: 0 | Status: COMPLETED | OUTPATIENT
Start: 2024-04-08 | End: 2024-04-08

## 2024-04-08 RX ORDER — INSULIN LISPRO 100/ML
VIAL (ML) SUBCUTANEOUS
Refills: 0 | Status: DISCONTINUED | OUTPATIENT
Start: 2024-04-08 | End: 2024-04-09

## 2024-04-08 RX ORDER — VALACYCLOVIR 500 MG/1
1000 TABLET, FILM COATED ORAL ONCE
Refills: 0 | Status: COMPLETED | OUTPATIENT
Start: 2024-04-08 | End: 2024-04-08

## 2024-04-08 RX ORDER — INFLUENZA VIRUS VACCINE 15; 15; 15; 15 UG/.5ML; UG/.5ML; UG/.5ML; UG/.5ML
0.5 SUSPENSION INTRAMUSCULAR ONCE
Refills: 0 | Status: COMPLETED | OUTPATIENT
Start: 2024-04-08 | End: 2024-04-08

## 2024-04-08 RX ORDER — INSULIN LISPRO 100/ML
5 VIAL (ML) SUBCUTANEOUS
Refills: 0 | Status: DISCONTINUED | OUTPATIENT
Start: 2024-04-08 | End: 2024-04-09

## 2024-04-08 RX ORDER — BENZTROPINE MESYLATE 1 MG
2 TABLET ORAL
Refills: 0 | Status: DISCONTINUED | OUTPATIENT
Start: 2024-04-08 | End: 2024-04-09

## 2024-04-08 RX ORDER — INSULIN GLARGINE 100 [IU]/ML
12 INJECTION, SOLUTION SUBCUTANEOUS ONCE
Refills: 0 | Status: COMPLETED | OUTPATIENT
Start: 2024-04-08 | End: 2024-04-08

## 2024-04-08 RX ORDER — INSULIN GLARGINE 100 [IU]/ML
25 INJECTION, SOLUTION SUBCUTANEOUS
Refills: 0 | DISCHARGE

## 2024-04-08 RX ORDER — ACETAMINOPHEN 500 MG
650 TABLET ORAL EVERY 6 HOURS
Refills: 0 | Status: DISCONTINUED | OUTPATIENT
Start: 2024-04-08 | End: 2024-04-09

## 2024-04-08 RX ORDER — SODIUM CHLORIDE 9 MG/ML
1000 INJECTION, SOLUTION INTRAVENOUS
Refills: 0 | Status: DISCONTINUED | OUTPATIENT
Start: 2024-04-08 | End: 2024-04-09

## 2024-04-08 RX ORDER — DEXTROSE 10 % IN WATER 10 %
125 INTRAVENOUS SOLUTION INTRAVENOUS ONCE
Refills: 0 | Status: DISCONTINUED | OUTPATIENT
Start: 2024-04-08 | End: 2024-04-09

## 2024-04-08 RX ORDER — METFORMIN HYDROCHLORIDE 850 MG/1
1 TABLET ORAL
Refills: 0 | DISCHARGE

## 2024-04-08 RX ORDER — DEXTROSE 50 % IN WATER 50 %
12.5 SYRINGE (ML) INTRAVENOUS ONCE
Refills: 0 | Status: DISCONTINUED | OUTPATIENT
Start: 2024-04-08 | End: 2024-04-09

## 2024-04-08 RX ADMIN — Medication 60 MILLIGRAM(S): at 00:18

## 2024-04-08 RX ADMIN — Medication 20 MILLIGRAM(S): at 18:23

## 2024-04-08 RX ADMIN — INSULIN GLARGINE 12 UNIT(S): 100 INJECTION, SOLUTION SUBCUTANEOUS at 03:16

## 2024-04-08 RX ADMIN — VALACYCLOVIR 1000 MILLIGRAM(S): 500 TABLET, FILM COATED ORAL at 22:35

## 2024-04-08 RX ADMIN — VALACYCLOVIR 1000 MILLIGRAM(S): 500 TABLET, FILM COATED ORAL at 13:57

## 2024-04-08 RX ADMIN — Medication 1 DROP(S): at 23:08

## 2024-04-08 RX ADMIN — VALACYCLOVIR 1000 MILLIGRAM(S): 500 TABLET, FILM COATED ORAL at 00:18

## 2024-04-08 RX ADMIN — Medication 2 MILLIGRAM(S): at 22:36

## 2024-04-08 RX ADMIN — Medication 4: at 18:21

## 2024-04-08 RX ADMIN — Medication 1 DROP(S): at 13:03

## 2024-04-08 RX ADMIN — Medication 8: at 12:52

## 2024-04-08 RX ADMIN — Medication 5 UNIT(S): at 18:21

## 2024-04-08 RX ADMIN — INSULIN GLARGINE 12 UNIT(S): 100 INJECTION, SOLUTION SUBCUTANEOUS at 23:08

## 2024-04-08 RX ADMIN — HALOPERIDOL DECANOATE 5 MILLIGRAM(S): 100 INJECTION INTRAMUSCULAR at 18:22

## 2024-04-08 RX ADMIN — SODIUM CHLORIDE 1000 MILLILITER(S): 9 INJECTION, SOLUTION INTRAVENOUS at 02:19

## 2024-04-08 RX ADMIN — Medication 5 UNIT(S): at 12:51

## 2024-04-08 NOTE — ED PROVIDER NOTE - OBJECTIVE STATEMENT
Spoke to the pt and mother with [facility]  [id number] and spoke to pt's uncle . 34 Y/O M PMH Diabetes mellitus on Metformin GERD Hypothyroid Developmental Delay Bipolar Disorder states that he has had a L sided facial droop for the past day making it difficult to drink water. As per mother last witnessed normal was at 2AM and the droop was seen at 7AM by his mother, pt states he noticed it at 4AM. Pt denies numbness, weakness or any other sx or acute complaints. Spoke to pt's uncle Kierra Reyes who is a pharmacist at  who is aware of the plan.

## 2024-04-08 NOTE — H&P ADULT - PROBLEM SELECTOR PLAN 1
Presents with facial droop noted by mother at 4AM on day of presentation, these symptoms followed a headache  Left facial weakness involving forehead noted on exam  DDx includes Bell's palsy vs migraine vs possible stroke (less likely given forehead involvement and lack of limb weakness)  Will further evaluate w/CT head  c/w prednisone 60mg daily and valacyclovir 1000mg TID x1 week

## 2024-04-08 NOTE — CONSULT NOTE ADULT - ASSESSMENT
33M PMH T2DM, bipolar disorder, intellectual delay presents with several hours of left-sided facial droop with concern for bells palsy  endocrine called for uncontrolled DM   a1c 10.1   (high risk patient with severely uncontrolled diabetes with A1c of 10.1 with severe hyperglycemia with glucose values > 300's at high risk of CAD and CVA with high level decision-making).         pt is a limited informant regarding DM and hx and medications  mother is also a limited informant     pt does not know DM meds   transitions of care pharmacist able to obtain outpt meds include   lantus 25 at night, rybellus 14mg, metformin and glinperide 2mg       Problem/Recommendation - 1:  ·  Problem: Uncontrolled type 2 diabetes mellitus with hyperglycemia, with long-term current use of insulin.     While inpatient  BG target 100-180 mg/dl,   - Please monitor blood glucose values TID AC & QHS while eating regular meals and Q6H while NPO  - Blood glucose goals pre-meal less than 140 mg/dL and random blood glucose less than 180 mg/dL  -currently on prednsione for bells palsy-plan for total 6 days tentaively      PLAN:  currently on prednisone   will need more bolus insulin >basal while on steroids   - Lantus 12   units qhs  - START Admelog to 5 units SC Premeal/TIDAC   - Admelog  moderate dose Correction Scale Premeal & seperate moderate dose  Correction Scale Bedtime   - Hypoglycemia protocol in place   - Carb Consistent Diet   - Nutrition consult   - Provider to RN for diabetes/insulin pen teaching       inform endocrine of hypoglycemia or persistent hyperglycemia episodes as changes in pts insulin regimen will need to be made.   notify endocrine if any plans to be NPO/diet changes as this will also affect insulin regimen.  inform endocrine if steroids are reduced or stopped       DC Medications  TBD   currently pt is not amenable to insulin   will be based on inpt needs, and need for steroids at dc  Full plan TBD- would need to determine where patient is going, who will be assisting with care and able to give insulin injections.  Need a safe dc plan can be decided and coordianted    Concern that current clinical status will prohibit him from being able to do this.        Discharge planning:  -Discussed with patient the importance of Carb consistent diet and exercise as tolerated.    -Recommend nutritional consultation  inpt prior to dc   -Discussed glycemic goal of a1c <7% to prevent microvascular complications of diabetes mellitus and reduce the risk of macrovascular complications.  - At home, Patient should check FSBG premeals and bedtime. Pt should call their doctor when FSBG <70 or above >400 and or consistently above 200s as changes in the regimen will have to be made.  - BG goals for outpatient 80-130mg/dl, premeal < 140mg/dL, 2 hours postprandial < 180mg/dL   - Hypoglycemia < 70mg/dL; review symptoms & management of hypoglycemia   - discussed importance of follow up care  -pt should call PMD for DM related questions or concerns until pt is seen by CDE or endocrine     -------------------------------------------------------------------------------------------------------------------------------------  DISCHARGE RX:  - Glucometer (ACCU_CHECK hal Conenct, Ascensia Contour Next EZ or One, Freestyle Freedome LITE or OneTouch Verio IQ)  - Glucometer test strips and lancets  (make sure compatible w glucometer), Dispense #100 (or #200) use as directed  Patient will also need a glucometer, test strips, lancets, alcohol pads,   - please ensure patient has prescriptions for DM supplies prior to discharge (glucometer, test strips, lancets, alcohol swabs, insulin pen needles, glucose gel, glucagon kit)      APPTS NEEDED:  PCP followup to take place, asap. I asked the pt to call PCP and inform them will need close fu for DM   - Recommend routine outpatient ophthalmology, podiatry and endocrinology f/u       Problem/Recommendation - 2:  ·  Problem: Essential hypertension.   ·  Recommendation: Goal BP < 130/80  outpt mc/cr ratio     Problem/Recommendation - 3:  ·  Problem: Hyperlipidemia.   ·  Recommendation  check lipid panel    prob 4  hypothyroidism noted in chart  per med [facility] by Dr. Mercado, pt has not been dispensed levoT  check TSH        Please involve SW and case management to help with dc planning        d.w provider          Demetria Acevedo MD  Attending Physician   Department of Endocrinology, Diabetes and Metabolism     weekdays: 9am to 5pm: [facility] or [facility] and or TEAMS     Nights and weekends: [phone]  Please note that this patient may be followed by a different provider tomorrow.   If no answer or after hours, please contact [phone].  For final dc reccomendations, please call [phone] or page the endocrine fellow on call.

## 2024-04-08 NOTE — ED ADULT NURSE NOTE - OBJECTIVE STATEMENT
Patient received in ED intake room 12. A&Ox4 and ambulatory. C/o left sided facial droop x 1 day causing difficulty swallowing. Dysphagia passed. Neuro intact with no weakness noted. Pt speaking in full and complete sentences. No acute distress noted. Denies CP, SOB, headache, lightheadedness, dizziness, fever or chills. IV 20g placed to right AC, labs drawn and sent as ordered. Medicated as ordered, see MAR. Respirations even and unlabored with equal chest rise. Family at bedside. Bed in lowest position, call bell in reach, wheels locked, safety maintained. Awaiting further orders.

## 2024-04-08 NOTE — H&P ADULT - PROBLEM SELECTOR PLAN 2
A1c>10%  Endocrinology team consulted given uncontrolled T2DM w/hyperglycemia  Awaiting medication history (per chart, Mr Xiao only takes metformin)  Hold oral agents while admitted, will c/w glargine 12U hs, ISS  f/u endocrinology recommendations A1c>10%  Endocrinology team consulted given uncontrolled T2DM w/hyperglycemia  Hold oral agents while admitted, will c/w glargine 12U hs, ISS  f/u endocrinology recommendations

## 2024-04-08 NOTE — H&P ADULT - PROBLEM SELECTOR PLAN 3
Appears clinically euthyroid  Awaiting medication history for possible levothyroxine use Appears clinically euthyroid  No longer on levothyroxine per [facility], will check TSH in AM

## 2024-04-08 NOTE — ED PROVIDER NOTE - CARE PLAN
Principal Discharge DX:	Diabetes mellitus with hyperglycemia  Secondary Diagnosis:	Developmental delay  Secondary Diagnosis:	Bell's palsy   1

## 2024-04-08 NOTE — ED ADULT NURSE NOTE - NSFALLUNIVINTERV_ED_ALL_ED
Discussed results and recommendations with patient who verbalized understanding and agrees with plan.  Transferred to [facility].    US LE Arteries Duplex Bilat:    Interrogation of the right lower extremity arterial system revealed normal  velocities without overt evidence of stenosis.    Interrogation of the left lower extremity arterial system revealed normal  velocities without overt evidence of stenosis.    The right brachial systolic pressure was 154 mmHg.  The right posterior  tibial systolic pressure was 194 mmHg.  The right dorsalis pedis systolic  pressure was 192 mmHg.  The right AMANDA was 1.22.    The left brachial systolic pressure was 159 mmHg.  The left posterior  tibial systolic pressure was 187 mmHg.  The left dorsalis pedis systolic  pressure was 181 mmHg.  The left AMANDA was 1.18.   Bed/Stretcher in lowest position, wheels locked, appropriate side rails in place/Call bell, personal items and telephone in reach/Instruct patient to call for assistance before getting out of bed/chair/stretcher/Non-slip footwear applied when patient is off stretcher/[place] to call system/Physically safe environment - no spills, clutter or unnecessary equipment/Purposeful proactive rounding/Room/bathroom lighting operational, light cord in reach

## 2024-04-08 NOTE — ED PROVIDER NOTE - CLINICAL SUMMARY MEDICAL DECISION MAKING FREE TEXT BOX
34 Y/O M PMH Diabetes mellitus on Metformin GERD Hypothyroid Developmental Delay Bipolar Disorder states that he has had a L sided facial droop for the past day making it difficult to drink water. As per mother last witnessed normal was at 2AM and the droop was seen at 7AM by his mother. Plan is antiviral and steroid therapy for bell's palsy. Due to recent visit for hyperglycemia pt to require admission for glycemic monitoring, endocrinology evaluation, possible insulin teaching though would be difficult due to developmental delay. Subjective   Patient is a 59 y.o. male presenting with chest pain.   History provided by:  Patient   used: No    Chest Pain   Pain location:  Substernal area  Pain quality: tightness    Pain quality: not burning, not crushing, not hot and no pressure    Pain radiates to:  Does not radiate  Pain severity:  Moderate  Onset quality:  Gradual  Duration:  2 weeks  Timing:  Intermittent  Progression:  Worsening  Chronicity:  New  Context comment:  Patient's been known for the past couple weeks been having dyspnea on exertion with some chest tightness.  He did have back on a bronchitic fever going on about 3 weeks ago but states it is resolved.  For minimal coughing at this point.  Relieved by:  Rest  Worsened by:  Exertion  Ineffective treatments:  None tried  Associated symptoms: fatigue and shortness of breath    Associated symptoms: no abdominal pain, no AICD problem, no altered mental status, no back pain, no dysphagia, no numbness and no palpitations    Risk factors: hypertension, male sex and smoking    Risk factors comment:  Strong family history father  of an MI at age 53.      Review of Systems   Constitutional: Positive for fatigue.   HENT: Negative for rhinorrhea, sneezing, sore throat and trouble swallowing.    Respiratory: Positive for chest tightness and shortness of breath. Negative for wheezing.    Cardiovascular: Positive for chest pain. Negative for palpitations and leg swelling.   Gastrointestinal: Negative for abdominal pain.   Genitourinary: Negative for dysuria and hematuria.   Musculoskeletal: Negative for back pain and neck pain.   Skin: Negative for pallor and rash.   Neurological: Negative for numbness.   Psychiatric/Behavioral: Negative.    All other systems reviewed and are negative.      History reviewed. No pertinent past medical history.    No Known Allergies    History reviewed. No pertinent surgical history.    Family History   Problem Relation Age of Onset   • No  Known Problems Mother    • Heart disease Father        Social History     Social History   • Marital status:      Spouse name: N/A   • Number of children: N/A   • Years of education: N/A     Social History Main Topics   • Smoking status: Current Every Day Smoker     Packs/day: 1.00     Types: Cigarettes   • Smokeless tobacco: None   • Alcohol use No   • Drug use: Defer   • Sexual activity: Defer     Other Topics Concern   • None     Social History Narrative     at [employer]            Objective   Physical Exam   Constitutional: He is oriented to person, place, and time. He appears well-developed and well-nourished.   HENT:   Head: Normocephalic and atraumatic.   Right Ear: External ear normal.   Left Ear: External ear normal.   Nose: Nose normal.   Mouth/Throat: Oropharynx is clear and moist.   Eyes: Conjunctivae and EOM are normal. Pupils are equal, round, and reactive to light. No scleral icterus.   Neck: Normal range of motion. No thyromegaly present.   Cardiovascular: Normal rate, regular rhythm and normal heart sounds.    Pulmonary/Chest: Effort normal and breath sounds normal. No respiratory distress. He has no wheezes. He has no rales. He exhibits no tenderness.   Abdominal: Soft. Bowel sounds are normal. He exhibits no distension. There is no tenderness.   Musculoskeletal: Normal range of motion.   Lymphadenopathy:     He has no cervical adenopathy.   Neurological: He is alert and oriented to person, place, and time. He has normal reflexes. He displays normal reflexes. No cranial nerve deficit. Coordination normal.   Skin: Skin is warm and dry.   Psychiatric: He has a normal mood and affect. His behavior is normal. Judgment and thought content normal.   Nursing note and vitals reviewed.      Procedures         ED Course  ED Course   Comment By Time   Dr. Bailey has seen and evaluated the patient. He does have a cough and some bronchitis symptoms but Dr. Bailey is concerned about his  multiple risk factors as well as his EKG. Carlos Clarke 01/07 1335   Dr. Nava, cardiologist, has been paged. Carlos Clarke 01/07 1335   Dr. Bailey has discussed the case with Dr. Nava, cardiologist, who will come to the ED and evaluate the patient. Carlos Clarke 01/07 1353   Patient was discussed with Dr. Sosa has come and seen the patient.  He spoke to Dr. Rivero who will admit the patient.  Plans for stress test versus heart catheter tomorrow. ELENA Patton 01/07 1811        No results found for this or any previous visit (from the past 24 hour(s)).  Note: In addition to lab results from this visit, the labs listed above may include labs taken at another facility or during a different encounter within the last 24 hours. Please correlate lab times with ED admission and discharge times for further clarification of the services performed during this visit.    XR Chest 1 View   Final Result   Chronic and emphysematous changes within  the lung fields   bilaterally with no acute parenchymal disease.       DICTATED:     01/07/2018   EDITED    :     01/07/2018        This report was finalized on 1/8/2018 9:05 AM by Dr. Cinthia Malcolm MD.            Vitals:    01/09/18 0724 01/09/18 0842 01/09/18 1140 01/09/18 1204   BP:  145/89 139/72    BP Location:   Left arm    Patient Position:   Lying    Pulse: 53 56 77 56   Resp: 18  16 18   Temp:   98.2 °F (36.8 °C)    TempSrc:   Oral    SpO2: 94%   94%   Weight:       Height:         Medications   nitroglycerin (NITROSTAT) ointment 1 inch (1 inch Topical Given 1/7/18 1152)   aspirin chewable tablet 324 mg (324 mg Oral Given 1/7/18 1152)     ECG/EMG Results (last 24 hours)     Procedure Component Value Units Date/Time    ECG 12 Lead [[id number]] Collected:  01/07/18 1131     Updated:  01/07/18 1335    Narrative:       Test Reason : SOA Protocol  Blood Pressure : **/** mmHG  Vent. Rate : 090 BPM     Atrial Rate : 090 BPM     P-R Int : 132 ms          QRS Dur  : 084 ms      QT Int : 338 ms       P-R-T Axes : 086 -79 068 degrees     QTc Int : 413 ms    Normal sinus rhythm  Biatrial enlargement  Peaked anterior T waves  Left anterior fascicular block  Cannot rule out Anterior infarct , age undetermined  Abnormal ECG  No previous ECGs available  Confirmed by DEBORA PAGAN MD ([id number]) on 1/7/2018 1:35:25 PM    Referred By:  EDMD           Confirmed By:DEBORA PAGAN MD    ECG 12 Lead [[id number]] Collected:  01/07/18 1626     Updated:  01/07/18 1754    Narrative:       Test Reason : second  Blood Pressure : **/** mmHG  Vent. Rate : 073 BPM     Atrial Rate : 073 BPM     P-R Int : 138 ms          QRS Dur : 090 ms      QT Int : 372 ms       P-R-T Axes : 082 -71 057 degrees     QTc Int : 409 ms    Normal sinus rhythm  Left anterior fascicular block  Left ventricular hypertrophy  Abnormal ECG  When compared with ECG of 07-JAN-2018 14:52, (Unconfirmed)  No significant change was found  Confirmed by DEBORA PAGAN MD ([id number]) on 1/7/2018 5:54:51 PM    Referred By:  EJ           Confirmed By:DEBORA PAGAN MD    ECG 12 Lead [[id number]] Collected:  01/07/18 1446     Updated:  01/07/18 1756    Narrative:       Test Reason : chest pain  Blood Pressure : **/** mmHG  Vent. Rate : 054 BPM     Atrial Rate : 057 BPM     P-R Int : 000 ms          QRS Dur : 100 ms      QT Int : 552 ms       P-R-T Axes : 000 -20 051 degrees     QTc Int : 523 ms    Normal sinus rhythm with occasional premature ventricular complexes  Low voltage QRS  Inferior infarct , age undetermined  Prolonged QT  Abnormal ECG  Confirmed by DEBORA PAGAN MD ([id number]) on 1/7/2018 5:56:44 PM    Referred By:  EJ           Confirmed By:DEBORA PAGAN MD    Adult Transthoracic Echo Complete W/ Cont if Necessary Per Protocol [[id number]] Collected:  01/07/18 1602     Updated:  01/07/18 1809     BSA 1.7 m^2      IVSd 1.0 cm      LVIDd 4.4 cm      LVIDs 3.7 cm      LVPWd 0.92 cm      IVS/LVPW 1.1     FS 16.1 %      EDV(Teich) 85.8 ml       ESV(Teich) 56.6 ml      EF(Teich) 34.0 %      EDV(cubed) 82.9 ml      ESV(cubed) 49.0 ml      EF(cubed) 40.8 %      LV mass(C)d 138.6 grams      LV mass(C)dI 79.7 grams/m^2      SV(Teich) 29.2 ml      SI(Teich) 16.8 ml/m^2      SV(cubed) 33.9 ml      SI(cubed) 19.5 ml/m^2      Ao root diam 3.1 cm      Ao root area 7.5 cm^2      asc Aorta Diam 2.4 cm      LVOT diam 2.1 cm      LVOT area 3.5 cm^2      LVOT area(traced) 3.5 cm^2      LVLd ap4 7.0 cm      EDV(MOD-sp4) 66.0 ml      LVLs ap4 6.1 cm      ESV(MOD-sp4) 36.0 ml      EF(MOD-sp4) 45.5 %      LVLd ap2 7.5 cm      EDV(MOD-sp2) 60.0 ml      LVLs ap2 5.8 cm      ESV(MOD-sp2) 32.0 ml      EF(MOD-sp2) 46.7 %      SV(MOD-sp4) 30.0 ml      SI(MOD-sp4) 17.3 ml/m^2      SV(MOD-sp2) 28.0 ml      SI(MOD-sp2) 16.1 ml/m^2      Ao root area (BSA corrected) 1.8     Ao root area (BSA corrected) 46.7 ml/m^2      CONTRAST EF 4CH 45.5 ml/m^2      LV Diastolic corrected for BSA 38.0 ml/m^2      LV Systolic corrected for BSA 20.7 ml/m^2      MV E max alexandro 56.8 cm/sec      MV A max alexandro 75.5 cm/sec      MV E/A 0.75     MV dec time 0.27 sec      AI max alexandro 470.0 cm/sec      AI max PG 88.5 mmHg      AI dec slope 151.5 cm/sec^2      AI P1/2t 908.6 msec      PA V2 max 134.0 cm/sec      PA max PG 7.2 mmHg      PA acc slope 958.0 cm/sec^2      PA acc time 0.13 sec      PA pr(Accel) 21.9 mmHg      BH CV ECHO LATHA - BZI_BMI 18.7 kilograms/m^2       CV ECHO LATHA - BSA(HAYCOCK) 1.7 m^2       CV ECHO LATHA - BZI_METRIC_WEIGHT 59.0 kg       CV ECHO LATHA - BZI_METRIC_HEIGHT 177.8 cm      TDI S' 20.70 cm/sec      RV Base 1.90 cm      RV Length 5.00 cm      RV Mid 1.20 cm      E/E' ratio 7.3     LA Volume Index 5.7 mL/m2      Lat Peak E' Alexandro 8.5 cm/sec      Med Peak E' Alexandro 7.74 cm/sec      TAPSE (>1.6) 2.94 cm2      Target HR (85%) 138 bpm      Max. Pred. HR (100%) 162 bpm     Narrative:       PRELIMINARY TECH FINDINGS ONLY    Impression:                         MDM  Number of Diagnoses or  Management Options  Chest pain with high risk for cardiac etiology: new and requires workup  SOB (shortness of breath): new and requires workup     Amount and/or Complexity of Data Reviewed  Clinical lab tests: reviewed and ordered  Tests in the radiology section of CPT®: reviewed and ordered  Tests in the medicine section of CPT®: reviewed and ordered  Discuss the patient with other providers: yes        Final diagnoses:   Chest pain with high risk for cardiac etiology   SOB (shortness of breath)            ELENA Patton  01/11/18 3313

## 2024-04-08 NOTE — H&P ADULT - HISTORY OF PRESENT ILLNESS
HPI:    33M PMH T2DM, bipolar disorder, intellectual delay presents with several hours of left-sided facial droop. Mr. Xiao's mom noted that the left side of his face appeared "twisted" and that he had difficulty keeping water in his mouth when he tried to drink water. She hasn't noticed any changes in his speech. He had a headache before these symptoms started. He does not have a headache at present. No sick contacts or travel. No bug bites noted.     PAST MEDICAL & SURGICAL HISTORY:  Diabetes mellitus  Psychiatric disorder  Unspecified diagnosis  GERD (gastroesophageal reflux disease)  Bipolar disorder  Hypothyroid  Intellectual delay  No significant past surgical history          Review of Systems:   CONSTITUTIONAL: No fever, weight loss, or fatigue  EYES: No eye pain, visual disturbances, or discharge  ENMT:  No difficulty hearing, tinnitus, vertigo; No sinus or throat pain  NECK: No pain or stiffness  BREASTS: No pain, masses, or nipple discharge  RESPIRATORY: No cough, wheezing, chills or hemoptysis; No shortness of breath  CARDIOVASCULAR: No chest pain, palpitations, dizziness, or leg swelling  GASTROINTESTINAL: No abdominal or epigastric pain. No nausea, vomiting, or hematemesis; No diarrhea or constipation. No melena or hematochezia.  GENITOURINARY: No dysuria, frequency, hematuria, or incontinence  NEUROLOGICAL: +headaches, no memory loss, numbness, or tremors; +facial droop  SKIN: No itching, burning, rashes, or lesions   LYMPH NODES: No enlarged glands  ENDOCRINE: No heat or cold intolerance; No hair loss  MUSCULOSKELETAL: No joint pain or swelling; No muscle, back, or extremity pain  PSYCHIATRIC: No depression, anxiety, mood swings, or difficulty sleeping  HEME/LYMPH: No easy bruising, or bleeding gums  ALLERGY AND IMMUNOLOGIC: No hives or eczema    Allergies    No Known Allergies    Intolerances      Social History:   Lives at home with family. Does not smoke, consume alcohol, or use drugs.    FAMILY HISTORY:  Family history unknown  Family history of diabetes mellitus  Family history of hypertension        MEDICATIONS  (STANDING):  artificial tears (preservative free) Ophthalmic Solution 1 Drop(s) Left EYE five times a day  dextrose 10% Bolus 125 milliLiter(s) IV Bolus once  dextrose 5%. 1000 milliLiter(s) (100 mL/Hr) IV Continuous <Continuous>  dextrose 5%. 1000 milliLiter(s) (50 mL/Hr) IV Continuous <Continuous>  dextrose 50% Injectable 25 Gram(s) IV Push once  dextrose 50% Injectable 12.5 Gram(s) IV Push once  glucagon  Injectable 1 milliGRAM(s) IntraMuscular once  insulin glargine Injectable (LANTUS) 12 Unit(s) SubCutaneous at bedtime  insulin lispro (ADMELOG) corrective regimen sliding scale   SubCutaneous three times a day before meals    MEDICATIONS  (PRN):  acetaminophen     Tablet .. 650 milliGRAM(s) Oral every 6 hours PRN Temp greater or equal to 38C (100.4F), Mild Pain (1 - 3)  dextrose Oral Gel 15 Gram(s) Oral once PRN Blood Glucose LESS THAN 70 milliGRAM(s)/deciliter      T(C): 36.6 (04-08-24 @ 06:24), Max: 36.9 (04-07-24 @ 22:15)  HR: 112 (04-08-24 @ 06:24) (105 - 115)  BP: 128/87 (04-08-24 @ 06:24) (120/86 - 138/94)  RR: 16 (04-08-24 @ 06:24) (16 - 16)  SpO2: 98% (04-08-24 @ 06:24) (98% - 98%)    Weight (kg): 52.2 (04-08-24 @ 02:05)  CAPILLARY BLOOD GLUCOSE      POCT Blood Glucose.: 384 mg/dL (08 Apr 2024 06:19)  POCT Blood Glucose.: 307 mg/dL (08 Apr 2024 03:15)  POCT Blood Glucose.: 236 mg/dL (07 Apr 2024 22:13)    I&O's Summary      PHYSICAL EXAM:  GENERAL: NAD, lying in bed  HEAD:  Atraumatic, Normocephalic  EYES: EOMI, PERRLA, conjunctiva and sclera clear  NECK: Supple, No elevated JVD  CHEST/LUNG: Clear to auscultation bilaterally; No wheeze  HEART: Regular rate and rhythm; No murmurs, rubs, or gallops  ABDOMEN: Soft, Nontender, Nondistended; Bowel sounds present  EXTREMITIES:  2+ Peripheral Pulses, No clubbing, cyanosis, or edema  PSYCH: AAOx3  NEUROLOGY: +L facial droop with L forehead weakness, 5/5 muscle strength throughout, normal gait   SKIN: No rashes or lesions    LABS:                        13.9   6.47  )-----------( 290      ( 08 Apr 2024 00:27 )             38.5     04-08    131<L>  |  95<L>  |  5<L>  ----------------------------<  229<H>  4.2   |  23  |  0.82    Ca    9.3      08 Apr 2024 00:27    TPro  7.9  /  Alb  4.5  /  TBili  0.6  /  DBili  x   /  AST  28  /  ALT  31  /  AlkPhos  129<H>  04-08          Urinalysis Basic - ( 08 Apr 2024 00:27 )    Color: x / Appearance: x / SG: x / pH: x  Gluc: 229 mg/dL / Ketone: x  / Bili: x / Urobili: x   Blood: x / Protein: x / Nitrite: x   Leuk Esterase: x / RBC: x / WBC x   Sq Epi: x / Non Sq Epi: x / Bacteria: x          RADIOLOGY & ADDITIONAL TESTS:    ECG Personally Reviewed -     Imaging Personally Reviewed:    Consultant(s) Notes Reviewed:      Care Discussed with Consultants/Other Providers:

## 2024-04-08 NOTE — H&P ADULT - ASSESSMENT
33M PMH T2DM, bipolar disorder, intellectual delay presents with several hours of left-sided facial droop. Found to have A1c>10%. He is admitted for evaluation of a new facial droop and for uncontrolled diabetes.

## 2024-04-08 NOTE — ED ADULT NURSE REASSESSMENT NOTE - NS ED NURSE REASSESS COMMENT FT1
Pt resting in stretcher, at baseline orientation status, offers no complaints of pain or discomfort at this time, VS as documented, FS checked, safety maintained, comfort provided, ambulatory with steady gait, awaiting bed placement at this time. Pt resting in stretcher, at baseline orientation status, offers no complaints of pain or discomfort at this time, VS as documented, , pt denies chest pain or palpitations, FS checked, safety maintained, comfort provided, ambulatory with steady gait, awaiting bed placement at this time.

## 2024-04-08 NOTE — CONSULT NOTE ADULT - SUBJECTIVE AND OBJECTIVE BOX
Reason For Consult: DM    HPI:    33M PMH T2DM, bipolar disorder, intellectual delay presents with several hours of left-sided facial droop. Mr. Xiao's mom noted that the left side of his face appeared "twisted" and that he had difficulty keeping water in his mouth when he tried to drink water. She hasn't noticed any changes in his speech. He had a headache before these symptoms started. He does not have a headache at present. No sick contacts or travel. No bug bites noted.     endocrine called for DM  please note pt is a limited informant   when asked about DM medications he states "something with a M"   when provider asked metformin he said yes.  he could not inform provider of other DM medications   to our clinical pharmacist, he showed list of medications and this included rybellus, Basaglar and other DM medications  pt does not disclose details of diet or if checking FSB[facility] at home   difficult to ascertain details     PAST MEDICAL & SURGICAL HISTORY:  Diabetes mellitus  Psychiatric disorder  Unspecified diagnosis  GERD (gastroesophageal reflux disease)  Bipolar disorder  Hypothyroid  Intellectual delay  No significant past surgical history          Review of Systems:   CONSTITUTIONAL: No fever, weight loss, or fatigue  EYES: No eye pain, visual disturbances, or discharge  ENMT:  No difficulty hearing, tinnitus, vertigo; No sinus or throat pain  NECK: No pain or stiffness  BREASTS: No pain, masses, or nipple discharge  RESPIRATORY: No cough, wheezing, chills or hemoptysis; No shortness of breath  CARDIOVASCULAR: No chest pain, palpitations, dizziness, or leg swelling  GASTROINTESTINAL: No abdominal or epigastric pain. No nausea, vomiting, or hematemesis; No diarrhea or constipation. No melena or hematochezia.  GENITOURINARY: No dysuria, frequency, hematuria, or incontinence  NEUROLOGICAL: +headaches, no memory loss, numbness, or tremors; +facial droop  SKIN: No itching, burning, rashes, or lesions   LYMPH NODES: No enlarged glands  ENDOCRINE: No heat or cold intolerance; No hair loss  MUSCULOSKELETAL: No joint pain or swelling; No muscle, back, or extremity pain  PSYCHIATRIC: No depression, anxiety, mood swings, or difficulty sleeping  HEME/LYMPH: No easy bruising, or bleeding gums  ALLERGY AND IMMUNOLOGIC: No hives or eczema    Allergies    No Known Allergies    Intolerances      Social History:   Lives at home with family. Does not smoke, consume alcohol, or use drugs.    FAMILY HISTORY:  Family history unknown  Family history of diabetes mellitus  Family history of hypertension        MEDICATIONS  (STANDING):  artificial tears (preservative free) Ophthalmic Solution 1 Drop(s) Left EYE five times a day  dextrose 10% Bolus 125 milliLiter(s) IV Bolus once  dextrose 5%. 1000 milliLiter(s) (100 mL/Hr) IV Continuous <Continuous>  dextrose 5%. 1000 milliLiter(s) (50 mL/Hr) IV Continuous <Continuous>  dextrose 50% Injectable 25 Gram(s) IV Push once  dextrose 50% Injectable 12.5 Gram(s) IV Push once  glucagon  Injectable 1 milliGRAM(s) IntraMuscular once  insulin glargine Injectable (LANTUS) 12 Unit(s) SubCutaneous at bedtime  insulin lispro (ADMELOG) corrective regimen sliding scale   SubCutaneous three times a day before meals    MEDICATIONS  (PRN):  acetaminophen     Tablet .. 650 milliGRAM(s) Oral every 6 hours PRN Temp greater or equal to 38C (100.4F), Mild Pain (1 - 3)  dextrose Oral Gel 15 Gram(s) Oral once PRN Blood Glucose LESS THAN 70 milliGRAM(s)/deciliter      T(C): 36.6 (04-08-24 @ 06:24), Max: 36.9 (04-07-24 @ 22:15)  HR: 112 (04-08-24 @ 06:24) (105 - 115)  BP: 128/87 (04-08-24 @ 06:24) (120/86 - 138/94)  RR: 16 (04-08-24 @ 06:24) (16 - 16)  SpO2: 98% (04-08-24 @ 06:24) (98% - 98%)    Weight (kg): 52.2 (04-08-24 @ 02:05)  CAPILLARY BLOOD GLUCOSE      POCT Blood Glucose.: 384 mg/dL (08 Apr 2024 06:19)  POCT Blood Glucose.: 307 mg/dL (08 Apr 2024 03:15)  POCT Blood Glucose.: 236 mg/dL (07 Apr 2024 22:13)    I&O's Summary      PHYSICAL EXAM:  GENERAL: NAD, lying in bed  HEAD:  Atraumatic, Normocephalic  EYES: EOMI, PERRLA, conjunctiva and sclera clear  NECK: Supple, No elevated JVD  CHEST/LUNG: Clear to auscultation bilaterally; No wheeze  HEART: Regular rate and rhythm; No murmurs, rubs, or gallops  ABDOMEN: Soft, Nontender, Nondistended; Bowel sounds present  EXTREMITIES:  2+ Peripheral Pulses, No clubbing, cyanosis, or edema  PSYCH: AAOx3  NEUROLOGY: +L facial droop with L forehead weakness, 5/5 muscle strength throughout, normal gait   SKIN: No rashes or lesions    LABS:                        13.9   6.47  )-----------( 290      ( 08 Apr 2024 00:27 )             38.5     04-08    131<L>  |  95<L>  |  5<L>  ----------------------------<  229<H>  4.2   |  23  |  0.82    Ca    9.3      08 Apr 2024 00:27    TPro  7.9  /  Alb  4.5  /  TBili  0.6  /  DBili  x   /  AST  28  /  ALT  31  /  AlkPhos  129<H>  04-08          Urinalysis Basic - ( 08 Apr 2024 00:27 )    Color: x / Appearance: x / SG: x / pH: x  Gluc: 229 mg/dL / Ketone: x  / Bili: x / Urobili: x   Blood: x / Protein: x / Nitrite: x   Leuk Esterase: x / RBC: x / WBC x   Sq Epi: x / Non Sq Epi: x / Bacteria: x          RADIOLOGY & ADDITIONAL TESTS:    ECG Personally Reviewed -     Imaging Personally Reviewed:    Consultant(s) Notes Reviewed:      Care Discussed with Consultants/Other Providers:   (08 Apr 2024 10:14)      PAST MEDICAL & SURGICAL HISTORY:  Diabetes mellitus      Psychiatric disorder  Unspecified diagnosis      GERD (gastroesophageal reflux disease)      Bipolar disorder      Hypothyroid      Mental retardation      No significant past surgical history          FAMILY HISTORY:  Family history unknown    Family history of diabetes mellitus    Family history of hypertension        Social History:    Outpatient Medications:    MEDICATIONS  (STANDING):  artificial tears (preservative free) Ophthalmic Solution 1 Drop(s) Left EYE five times a day  benztropine 2 milliGRAM(s) Oral two times a day  dextrose 10% Bolus 125 milliLiter(s) IV Bolus once  dextrose 5%. 1000 milliLiter(s) (100 mL/Hr) IV Continuous <Continuous>  dextrose 5%. 1000 milliLiter(s) (50 mL/Hr) IV Continuous <Continuous>  dextrose 50% Injectable 25 Gram(s) IV Push once  dextrose 50% Injectable 12.5 Gram(s) IV Push once  glucagon  Injectable 1 milliGRAM(s) IntraMuscular once  haloperidol     Tablet 5 milliGRAM(s) Oral two times a day  insulin glargine Injectable (LANTUS) 12 Unit(s) SubCutaneous at bedtime  insulin lispro (ADMELOG) corrective regimen sliding scale   SubCutaneous at bedtime  insulin lispro (ADMELOG) corrective regimen sliding scale   SubCutaneous three times a day before meals  insulin lispro Injectable (ADMELOG) 5 Unit(s) SubCutaneous three times a day before meals  predniSONE   Tablet 20 milliGRAM(s) Oral every 24 hours  valACYclovir 1000 milliGRAM(s) Oral three times a day    MEDICATIONS  (PRN):  acetaminophen     Tablet .. 650 milliGRAM(s) Oral every 6 hours PRN Temp greater or equal to 38C (100.4F), Mild Pain (1 - 3)  dextrose Oral Gel 15 Gram(s) Oral once PRN Blood Glucose LESS THAN 70 milliGRAM(s)/deciliter      Allergies    No Known Allergies    Intolerances      Review of Systems:  Constitutional: No fever  Eyes: No blurry vision  Neuro: No tremors  HEENT: No pain  Cardiovascular: No chest pain, palpitations  Respiratory: No SOB, no cough  GI: No nausea, vomiting, abdominal pain  : No dysuria  Skin: no rash  Psych: no depression  Endocrine: no polyuria, polydipsia  Hem/lymph: no swelling  Osteoporosis: no fractures    ALL OTHER SYSTEMS REVIEWED AND NEGATIVE    UNABLE TO OBTAIN    PHYSICAL EXAM:  VITALS: T(C): 36.6 (04-08-24 @ 06:24)  T(F): 97.8 (04-08-24 @ 06:24), Max: 98.4 (04-07-24 @ 22:15)  HR: 112 (04-08-24 @ 06:24) (105 - 115)  BP: 128/87 (04-08-24 @ 06:24) (120/86 - 138/94)  RR:  (16 - 16)  SpO2:  (98% - 98%)  Wt(kg): --  GENERAL: NAD, well-groomed, well-developed  EYES: No proptosis, no lid lag, anicteric  HEENT:  Atraumatic, Normocephalic, moist mucous membranes  THYROID: Normal size, no palpable nodules  RESPIRATORY: Clear to auscultation bilaterally; No rales, rhonchi, wheezing, or rubs  CARDIOVASCULAR: Regular rate and rhythm; No murmurs; no peripheral edema  GI: Soft, nontender, non distended, normal bowel sounds  SKIN: Dry, intact, No rashes or lesions  MUSCULOSKELETAL: Full range of motion, normal strength  NEURO: sensation intact, extraocular movements intact, no tremor, normal reflexes  PSYCH: Alert and oriented x 3, normal affect, normal mood  CUSHING'S SIGNS: no striae    POCT Blood Glucose.: 339 mg/dL (04-08-24 @ 12:44)  POCT Blood Glucose.: 397 mg/dL (04-08-24 @ 11:07)  POCT Blood Glucose.: 384 mg/dL (04-08-24 @ 06:19)  POCT Blood Glucose.: 307 mg/dL (04-08-24 @ 03:15)  POCT Blood Glucose.: 236 mg/dL (04-07-24 @ 22:13)                            13.9   6.47  )-----------( 290      ( 08 Apr 2024 00:27 )             38.5       04-08    132<L>  |  93<L>  |  7   ----------------------------<  426<H>  4.7   |  24  |  0.72    eGFR: 124    Ca    10.4      04-08    TPro  7.9  /  Alb  4.5  /  TBili  0.6  /  DBili  x   /  AST  28  /  ALT  31  /  AlkPhos  129<H>  04-08      Thyroid Function Tests:              Radiology:                      Reason For Consult: DM    HPI:    33M PMH T2DM, bipolar disorder, intellectual delay presents with several hours of left-sided facial droop. Mr. Xiao's mom noted that the left side of his face appeared "twisted" and that he had difficulty keeping water in his mouth when he tried to drink water. She hasn't noticed any changes in his speech. He had a headache before these symptoms started. He does not have a headache at present. No sick contacts or travel. No bug bites noted.     endocrine called for DM  please note pt is a limited informant   when asked about DM medications he states "something with a M"   when provider asked metformin he said yes.  he could not inform provider of other DM medications   to our clinical pharmacist, he showed list of medications and this included rybellus, LANTUS  glimperide- unable to confirm with pt or mother regarding this  pt does not disclose details of diet or if checking [facility]SB[facility] at home, when asked about diet he states "yes", but does not give details as to the diet   difficult to ascertain details   hypothyroism noted in chart however pt has not been dispensed levoT   we spoke about uncontrolled DM and especially need for insulin at this time while on steroids         PAST MEDICAL & SURGICAL HISTORY:  Diabetes mellitus  Psychiatric disorder  Unspecified diagnosis  GERD (gastroesophageal reflux disease)  Bipolar disorder  Hypothyroid  Intellectual delay  No significant past surgical history          Review of Systems:   CONSTITUTIONAL: No fever, weight loss, or fatigue  EYES: No eye pain, visual disturbances, or discharge  ENMT:  No difficulty hearing, tinnitus, vertigo; No sinus or throat pain  NECK: No pain or stiffness  BREASTS: No pain, masses, or nipple discharge  RESPIRATORY: No cough, wheezing, chills or hemoptysis; No shortness of breath  CARDIOVASCULAR: No chest pain, palpitations, dizziness, or leg swelling  GASTROINTESTINAL: No abdominal or epigastric pain. No nausea, vomiting, or hematemesis; No diarrhea or constipation. No melena or hematochezia.  GENITOURINARY: No dysuria, frequency, hematuria, or incontinence  NEUROLOGICAL: +headaches, no memory loss, numbness, or tremors; +facial droop  SKIN: No itching, burning, rashes, or lesions   LYMPH NODES: No enlarged glands  ENDOCRINE: No heat or cold intolerance; No hair loss  MUSCULOSKELETAL: No joint pain or swelling; No muscle, back, or extremity pain  PSYCHIATRIC: No depression, anxiety, mood swings, or difficulty sleeping  HEME/LYMPH: No easy bruising, or bleeding gums  ALLERGY AND IMMUNOLOGIC: No hives or eczema    Allergies    No Known Allergies    Intolerances      Social History:   Lives at home with family. Does not smoke, consume alcohol, or use drugs.    FAMILY HISTORY:  Family history unknown  Family history of diabetes mellitus  Family history of hypertension        MEDICATIONS  (STANDING):  artificial tears (preservative free) Ophthalmic Solution 1 Drop(s) Left EYE five times a day  dextrose 10% Bolus 125 milliLiter(s) IV Bolus once  dextrose 5%. 1000 milliLiter(s) (100 mL/Hr) IV Continuous <Continuous>  dextrose 5%. 1000 milliLiter(s) (50 mL/Hr) IV Continuous <Continuous>  dextrose 50% Injectable 25 Gram(s) IV Push once  dextrose 50% Injectable 12.5 Gram(s) IV Push once  glucagon  Injectable 1 milliGRAM(s) IntraMuscular once  insulin glargine Injectable (LANTUS) 12 Unit(s) SubCutaneous at bedtime  insulin lispro (ADMELOG) corrective regimen sliding scale   SubCutaneous three times a day before meals    MEDICATIONS  (PRN):  acetaminophen     Tablet .. 650 milliGRAM(s) Oral every 6 hours PRN Temp greater or equal to 38C (100.4F), Mild Pain (1 - 3)  dextrose Oral Gel 15 Gram(s) Oral once PRN Blood Glucose LESS THAN 70 milliGRAM(s)/deciliter      T(C): 36.6 (04-08-24 @ 06:24), Max: 36.9 (04-07-24 @ 22:15)  HR: 112 (04-08-24 @ 06:24) (105 - 115)  BP: 128/87 (04-08-24 @ 06:24) (120/86 - 138/94)  RR: 16 (04-08-24 @ 06:24) (16 - 16)  SpO2: 98% (04-08-24 @ 06:24) (98% - 98%)    Weight (kg): 52.2 (04-08-24 @ 02:05)  CAPILLARY BLOOD GLUCOSE      POCT Blood Glucose.: 384 mg/dL (08 Apr 2024 06:19)  POCT Blood Glucose.: 307 mg/dL (08 Apr 2024 03:15)  POCT Blood Glucose.: 236 mg/dL (07 Apr 2024 22:13)    I&O's Summary      PHYSICAL EXAM:  GENERAL: NAD, lying in bed  HEAD:  Atraumatic, Normocephalic  CHEST/LUNG: Clear to auscultation bilaterally; No wheeze  HEART: Regular rate and rhythm; No murmurs, rubs, or gallops  ABDOMEN: Soft, Nontender, Nondistended; Bowel sounds present  EXTREMITIES:  2+ Peripheral Pulses, No clubbing, cyanosis, or edema  PSYCH: AAOx3  NEUROLOGY: +L facial droop with L forehead weakness, 5/5 muscle strength throughout, normal gait   SKIN: No rashes or lesions    LABS:                        13.9   6.47  )-----------( 290      ( 08 Apr 2024 00:27 )             38.5     04-08    131<L>  |  95<L>  |  5<L>  ----------------------------<  229<H>  4.2   |  23  |  0.82    Ca    9.3      08 Apr 2024 00:27    TPro  7.9  /  Alb  4.5  /  TBili  0.6  /  DBili  x   /  AST  28  /  ALT  31  /  AlkPhos  129<H>  04-08        MEDICATIONS  (STANDING):  artificial tears (preservative free) Ophthalmic Solution 1 Drop(s) Left EYE five times a day  benztropine 2 milliGRAM(s) Oral two times a day  dextrose 10% Bolus 125 milliLiter(s) IV Bolus once  dextrose 5%. 1000 milliLiter(s) (100 mL/Hr) IV Continuous <Continuous>  dextrose 5%. 1000 milliLiter(s) (50 mL/Hr) IV Continuous <Continuous>  dextrose 50% Injectable 25 Gram(s) IV Push once  dextrose 50% Injectable 12.5 Gram(s) IV Push once  glucagon  Injectable 1 milliGRAM(s) IntraMuscular once  haloperidol     Tablet 5 milliGRAM(s) Oral two times a day  insulin glargine Injectable (LANTUS) 12 Unit(s) SubCutaneous at bedtime  insulin lispro (ADMELOG) corrective regimen sliding scale   SubCutaneous at bedtime  insulin lispro (ADMELOG) corrective regimen sliding scale   SubCutaneous three times a day before meals  insulin lispro Injectable (ADMELOG) 5 Unit(s) SubCutaneous three times a day before meals  predniSONE   Tablet 20 milliGRAM(s) Oral every 24 hours  valACYclovir 1000 milliGRAM(s) Oral three times a day    MEDICATIONS  (PRN):  acetaminophen     Tablet .. 650 milliGRAM(s) Oral every 6 hours PRN Temp greater or equal to 38C (100.4F), Mild Pain (1 - 3)  dextrose Oral Gel 15 Gram(s) Oral once PRN Blood Glucose LESS THAN 70 milliGRAM(s)/deciliter                              13.9   6.47  )-----------( 290      ( 08 Apr 2024 00:27 )             38.5       04-08    132<L>  |  93<L>  |  7   ----------------------------<  426<H>  4.7   |  24  |  0.72    eGFR: 124    Ca    10.4      04-08    TPro  7.9  /  Alb  4.5  /  TBili  0.6  /  DBili  x   /  AST  28  /  ALT  31  /  AlkPhos  129<H>  04-08      Thyroid Function Tests:              Radiology:

## 2024-04-08 NOTE — ED PROVIDER NOTE - ATTENDING APP SHARED VISIT CONTRIBUTION OF CARE
Brief HPI:  33-year-old male past medical history of developmental delay, diabetes on metformin, hypothyroidism presents for left facial droop starting last night.  Per mother, patient has had left-sided facial droop since 7 AM this morning when patient awoke, last known "normal" time was 2 AM.  Patient is having difficulty drinking.  Denies headache, numbness, tingling, weakness of extremities.  No recent travel or sick contacts.  No time in woods or insect bites.  Denies eye pain or tearing.    Vitals:   Reviewed    Exam:    GEN:  Non-toxic appearing, non-distressed, speaking full sentences, non-diaphoretic, AAOx3  HEENT:  NCAT, neck supple, EOMI, PERRLA, sclera anicteric, no conjunctival pallor or injection, no stridor, normal voice, no tonsillar exudate, uvula midline  CV:  regular rhythm and rate, s1/s2 audible, no murmurs, rubs or gallops, peripheral pulses 2+ and symmetric  PULM:  non-labored respirations, lungs clear to auscultation bilaterally, no wheezes, crackles or rales  ABD:  non distended, non-tender, no rebound, no guarding, negative Navarro's sign, bowel sounds normal, no cvat  MSK:  no gross deformity, non-tender extremities and joints, range of motion grossly normal appearing, no extremity edema, extremities warm and well perfused   NEURO:  AAOx3, left sided facial droop (involves forehead), motor 5/5 in upper and lower extremities bilaterally, sensation grossly intact in extremities and trunk, finger to nose testing wnl, no nystagmus, negative Romberg, no pronator drift, no gait deficit  SKIN:  warm, dry, no rash or vesicles     A/P:  33-year-old male past medical history of developmental delay, diabetes on metformin, hypothyroidism presents for left facial droop starting last night.  Vital signs stable, afebrile.  Exam nontoxic-appearing, left-sided facial droop with forehead involvement suggestive of Bell's palsy.  Low concern for corneal injury as patient denies eye pain, tearing.  Patient hyperglycemic.  Patient is at risk for hypoglycemic complications if discharged with course of prednisone.  Discussion held with family regarding discharge with antiviral treatment versus admission for initiation of glucocorticoid with glucose monitoring.  Family is electing for admission.  Will send labs, anticipate admission.

## 2024-04-08 NOTE — H&P ADULT - PROBLEM SELECTOR PLAN 5
Called [facility], however, no answer  Mother does not have medications with her  Pharmacy team consulted for assistance with obtaining medication history Low risk for VTE

## 2024-04-08 NOTE — ED PROVIDER NOTE - CRANIAL NERVE AND PUPILLARY EXAM
+ L sided facial droop. 5/5 strength and intact sensation bilaterally in upper and lower extremity. No pronatory drift.

## 2024-04-09 ENCOUNTER — TRANSCRIPTION ENCOUNTER (OUTPATIENT)
Age: 34
End: 2024-04-09

## 2024-04-09 VITALS
TEMPERATURE: 98 F | SYSTOLIC BLOOD PRESSURE: 117 MMHG | DIASTOLIC BLOOD PRESSURE: 77 MMHG | RESPIRATION RATE: 18 BRPM | OXYGEN SATURATION: 99 % | HEART RATE: 99 BPM

## 2024-04-09 DIAGNOSIS — G51.0 BELL'S PALSY: ICD-10-CM

## 2024-04-09 LAB
ANION GAP SERPL CALC-SCNC: 15 MMOL/L — HIGH (ref 7–14)
BUN SERPL-MCNC: 10 MG/DL — SIGNIFICANT CHANGE UP (ref 7–23)
CALCIUM SERPL-MCNC: 9.5 MG/DL — SIGNIFICANT CHANGE UP (ref 8.4–10.5)
CHLORIDE SERPL-SCNC: 98 MMOL/L — SIGNIFICANT CHANGE UP (ref 98–107)
CO2 SERPL-SCNC: 22 MMOL/L — SIGNIFICANT CHANGE UP (ref 22–31)
CREAT SERPL-MCNC: 0.76 MG/DL — SIGNIFICANT CHANGE UP (ref 0.5–1.3)
EGFR: 122 ML/MIN/1.73M2 — SIGNIFICANT CHANGE UP
GLUCOSE BLDC GLUCOMTR-MCNC: 154 MG/DL — HIGH (ref 70–99)
GLUCOSE BLDC GLUCOMTR-MCNC: 233 MG/DL — HIGH (ref 70–99)
GLUCOSE BLDC GLUCOMTR-MCNC: 259 MG/DL — HIGH (ref 70–99)
GLUCOSE SERPL-MCNC: 273 MG/DL — HIGH (ref 70–99)
HCT VFR BLD CALC: 36.4 % — LOW (ref 39–50)
HGB BLD-MCNC: 13 G/DL — SIGNIFICANT CHANGE UP (ref 13–17)
MCHC RBC-ENTMCNC: 29.8 PG — SIGNIFICANT CHANGE UP (ref 27–34)
MCHC RBC-ENTMCNC: 35.7 GM/DL — SIGNIFICANT CHANGE UP (ref 32–36)
MCV RBC AUTO: 83.5 FL — SIGNIFICANT CHANGE UP (ref 80–100)
NRBC # BLD: 0 /100 WBCS — SIGNIFICANT CHANGE UP (ref 0–0)
NRBC # FLD: 0 K/UL — SIGNIFICANT CHANGE UP (ref 0–0)
PLATELET # BLD AUTO: 287 K/UL — SIGNIFICANT CHANGE UP (ref 150–400)
POTASSIUM SERPL-MCNC: 4.1 MMOL/L — SIGNIFICANT CHANGE UP (ref 3.5–5.3)
POTASSIUM SERPL-SCNC: 4.1 MMOL/L — SIGNIFICANT CHANGE UP (ref 3.5–5.3)
RBC # BLD: 4.36 M/UL — SIGNIFICANT CHANGE UP (ref 4.2–5.8)
RBC # FLD: 11.8 % — SIGNIFICANT CHANGE UP (ref 10.3–14.5)
SODIUM SERPL-SCNC: 135 MMOL/L — SIGNIFICANT CHANGE UP (ref 135–145)
TSH SERPL-MCNC: 0.47 UIU/ML — SIGNIFICANT CHANGE UP (ref 0.27–4.2)
WBC # BLD: 6.16 K/UL — SIGNIFICANT CHANGE UP (ref 3.8–10.5)
WBC # FLD AUTO: 6.16 K/UL — SIGNIFICANT CHANGE UP (ref 3.8–10.5)

## 2024-04-09 PROCEDURE — 99232 SBSQ HOSP IP/OBS MODERATE 35: CPT

## 2024-04-09 PROCEDURE — 99239 HOSP IP/OBS DSCHRG MGMT >30: CPT

## 2024-04-09 RX ORDER — INSULIN GLARGINE 100 [IU]/ML
15 INJECTION, SOLUTION SUBCUTANEOUS AT BEDTIME
Refills: 0 | Status: DISCONTINUED | OUTPATIENT
Start: 2024-04-09 | End: 2024-04-09

## 2024-04-09 RX ORDER — METFORMIN HYDROCHLORIDE 850 MG/1
1 TABLET ORAL
Qty: 60 | Refills: 0
Start: 2024-04-09

## 2024-04-09 RX ORDER — GLUCAGON INJECTION, SOLUTION 0.5 MG/.1ML
1 INJECTION, SOLUTION SUBCUTANEOUS
Qty: 1 | Refills: 0
Start: 2024-04-09 | End: 2024-05-08

## 2024-04-09 RX ORDER — INSULIN NPH HUM/REG INSULIN HM 70-30/ML
0 VIAL (ML) SUBCUTANEOUS
Refills: 0
Start: 2024-04-09

## 2024-04-09 RX ORDER — INSULIN LISPRO 100/ML
VIAL (ML) SUBCUTANEOUS
Refills: 0 | Status: DISCONTINUED | OUTPATIENT
Start: 2024-04-09 | End: 2024-04-09

## 2024-04-09 RX ORDER — INSULIN LISPRO 100/ML
0 VIAL (ML) SUBCUTANEOUS
Qty: 1 | Refills: 0
Start: 2024-04-09

## 2024-04-09 RX ORDER — INSULIN LISPRO 100/ML
0 VIAL (ML) SUBCUTANEOUS
Qty: 5 | Refills: 0
Start: 2024-04-09

## 2024-04-09 RX ORDER — INSULIN GLARGINE 100 [IU]/ML
0 INJECTION, SOLUTION SUBCUTANEOUS
Qty: 5 | Refills: 0
Start: 2024-04-09

## 2024-04-09 RX ORDER — ISOPROPYL ALCOHOL, BENZOCAINE .7; .06 ML/ML; ML/ML
0 SWAB TOPICAL
Qty: 100 | Refills: 1
Start: 2024-04-09

## 2024-04-09 RX ORDER — GLUCAGON INJECTION, SOLUTION 0.5 MG/.1ML
0 INJECTION, SOLUTION SUBCUTANEOUS
Qty: 1 | Refills: 0
Start: 2024-04-09

## 2024-04-09 RX ORDER — INSULIN GLARGINE 100 [IU]/ML
0 INJECTION, SOLUTION SUBCUTANEOUS
Refills: 0 | DISCHARGE

## 2024-04-09 RX ORDER — INSULIN DETEMIR 100/ML (3)
0 INSULIN PEN (ML) SUBCUTANEOUS
Qty: 5 | Refills: 0
Start: 2024-04-09

## 2024-04-09 RX ORDER — INSULIN LISPRO 100/ML
VIAL (ML) SUBCUTANEOUS AT BEDTIME
Refills: 0 | Status: DISCONTINUED | OUTPATIENT
Start: 2024-04-09 | End: 2024-04-09

## 2024-04-09 RX ORDER — INSULIN ASPART 100 [IU]/ML
0 INJECTION, SOLUTION SUBCUTANEOUS
Qty: 5 | Refills: 0
Start: 2024-04-09

## 2024-04-09 RX ORDER — INSULIN GLARGINE 100 [IU]/ML
18 INJECTION, SOLUTION SUBCUTANEOUS
Qty: 1 | Refills: 0
Start: 2024-04-09 | End: 2024-05-08

## 2024-04-09 RX ORDER — INSULIN LISPRO 100/ML
8 VIAL (ML) SUBCUTANEOUS
Refills: 0 | Status: DISCONTINUED | OUTPATIENT
Start: 2024-04-09 | End: 2024-04-09

## 2024-04-09 RX ORDER — ACETAMINOPHEN 500 MG
2 TABLET ORAL
Qty: 0 | Refills: 0 | DISCHARGE
Start: 2024-04-09

## 2024-04-09 RX ORDER — VALACYCLOVIR 500 MG/1
1 TABLET, FILM COATED ORAL
Qty: 14 | Refills: 0
Start: 2024-04-09 | End: 2024-04-13

## 2024-04-09 RX ORDER — VALACYCLOVIR 500 MG/1
1 TABLET, FILM COATED ORAL
Qty: 15 | Refills: 0
Start: 2024-04-09 | End: 2024-04-13

## 2024-04-09 RX ORDER — GLIMEPIRIDE 1 MG
1 TABLET ORAL
Refills: 0 | DISCHARGE

## 2024-04-09 RX ADMIN — Medication 4: at 09:11

## 2024-04-09 RX ADMIN — VALACYCLOVIR 1000 MILLIGRAM(S): 500 TABLET, FILM COATED ORAL at 05:11

## 2024-04-09 RX ADMIN — VALACYCLOVIR 1000 MILLIGRAM(S): 500 TABLET, FILM COATED ORAL at 12:43

## 2024-04-09 RX ADMIN — Medication 3: at 12:42

## 2024-04-09 RX ADMIN — Medication 1 DROP(S): at 05:11

## 2024-04-09 RX ADMIN — Medication 5 UNIT(S): at 09:11

## 2024-04-09 RX ADMIN — VALACYCLOVIR 1000 MILLIGRAM(S): 500 TABLET, FILM COATED ORAL at 18:19

## 2024-04-09 RX ADMIN — Medication 2: at 18:06

## 2024-04-09 RX ADMIN — Medication 8 UNIT(S): at 18:06

## 2024-04-09 RX ADMIN — Medication 2 MILLIGRAM(S): at 05:10

## 2024-04-09 RX ADMIN — Medication 40 MILLIGRAM(S): at 16:47

## 2024-04-09 RX ADMIN — Medication 8 UNIT(S): at 12:41

## 2024-04-09 RX ADMIN — HALOPERIDOL DECANOATE 5 MILLIGRAM(S): 100 INJECTION INTRAMUSCULAR at 05:11

## 2024-04-09 RX ADMIN — HALOPERIDOL DECANOATE 5 MILLIGRAM(S): 100 INJECTION INTRAMUSCULAR at 18:11

## 2024-04-09 RX ADMIN — Medication 1 DROP(S): at 12:44

## 2024-04-09 RX ADMIN — Medication 2 MILLIGRAM(S): at 18:11

## 2024-04-09 RX ADMIN — Medication 1 DROP(S): at 14:10

## 2024-04-09 RX ADMIN — Medication 20 MILLIGRAM(S): at 12:48

## 2024-04-09 NOTE — SWALLOW BEDSIDE ASSESSMENT ADULT - MODE OF PRESENTATION
spoon/fed by clinician patient reports preference for straw "falls out of my mouth without the straw with my face"/cup/straw self fed

## 2024-04-09 NOTE — DISCHARGE NOTE PROVIDER - NSDCFUADDINST_GEN_ALL_CORE_FT
Please call your doctor if you fs is 70 or below and or 350 (while on steroids) and above; and if consistently 250 and above while off steroids Area M Indication Text: Tumors in this location are included in Area M (cheek, forehead, scalp, neck, jawline and pretibial skin).  Mohs surgery is indicated for tumors in these anatomic locations.

## 2024-04-09 NOTE — SWALLOW BEDSIDE ASSESSMENT ADULT - CONSISTENCIES ADMINISTERED
8 oz (4 ounces consecutive straw sips; 4 ounces single cup sips)/thin liquid half cracker/regular solid 2 oz/pureed

## 2024-04-09 NOTE — DISCHARGE NOTE PROVIDER - NSDCFUADDAPPT_GEN_ALL_CORE_FT
Follow up with your primary care physician for further monitoring in 1-2 weeks. Please call to arrange appointment.  Follow up with endocrinology within 1-2 weeks of discharge.

## 2024-04-09 NOTE — DISCHARGE NOTE PROVIDER - ATTENDING DISCHARGE PHYSICAL EXAMINATION:
.  VITAL SIGNS:  T(C): 36.9 (04-09-24 @ 10:00), Max: 36.9 (04-09-24 @ 10:00)  T(F): 98.5 (04-09-24 @ 10:00), Max: 98.5 (04-09-24 @ 10:00)  HR: 105 (04-09-24 @ 10:00) (78 - 105)  BP: 122/83 (04-09-24 @ 10:00) (103/64 - 127/84)  BP(mean): 98 (04-08-24 @ 17:07) (98 - 98)  RR: 18 (04-09-24 @ 10:00) (17 - 18)  SpO2: 100% (04-09-24 @ 10:00) (96% - 100%)  Wt(kg): --    PHYSICAL EXAM:    Constitutional: resting comfortably in bed; NAD  Head: NC/AT  Eyes: PERRL, EOMI, anicteric sclera  ENT: no nasal discharge; uvula midline, no oropharyngeal erythema or exudates; MMM  Respiratory: CTA B/L; no W/R/R, no retractions  Cardiac: +S1/S2; RRR; no M/R/G; PMI non-displaced  Gastrointestinal: abdomen soft, NT/ND; no rebound or guarding; +BSx4; no CVAT B/L  Extremities: WWP, no clubbing or cyanosis; no peripheral edema  Musculoskeletal: NROM x4; no joint swelling, tenderness or erythema  Vascular: 2+ radial, femoral, DP/PT pulses B/L  Dermatologic: skin warm, dry and intact; no rashes, wounds, or scars  Neurologic: AAOx3; CNII-XII grossly intact; no focal deficits

## 2024-04-09 NOTE — DISCHARGE NOTE PROVIDER - NSFOLLOWUPCLINICS_GEN_ALL_ED_FT
[facility]  Endocrinology  [address] [place] [address]  Phone: [phone]  Fax:

## 2024-04-09 NOTE — PROGRESS NOTE ADULT - PROBLEM SELECTOR PLAN 3
Appears clinically euthyroid  No longer on levothyroxine per [facility], will check TSH in AM Appears clinically euthyroid  No longer on levothyroxine per [facility],  tsh wnl

## 2024-04-09 NOTE — DISCHARGE NOTE PROVIDER - NSDCMRMEDTOKEN_GEN_ALL_CORE_FT
benztropine 2 mg oral tablet: 1 tab(s) orally 2 times a day  glimepiride 2 mg oral tablet: 1 tab(s) orally once a day (30-day supply last dispensed 2/28/24)  haloperidol 5 mg oral tablet: 1 tab(s) orally 2 times a day  ketoconazole 2% topical cream: Apply topically to affected area 2 times a day (left foot)  Lantus 100 units/mL subcutaneous solution: 25 to 30 unit(s) subcutaneous once a day (30-day supply last dispensed 3/29/24)  metFORMIN 1000 mg oral tablet: 1 tab(s) orally 2 times a day  Rybelsus 14 mg oral tablet: 1 tab(s) orally once a day   acetaminophen 325 mg oral tablet: 2 tab(s) orally every 6 hours As needed Temp greater or equal to 38C (100.4F), Mild Pain (1 - 3)  benztropine 2 mg oral tablet: 1 tab(s) orally 2 times a day  glimepiride 2 mg oral tablet: 1 tab(s) orally once a day (30-day supply last dispensed 2/28/24)  haloperidol 5 mg oral tablet: 1 tab(s) orally 2 times a day  ketoconazole 2% topical cream: Apply topically to affected area 2 times a day (left foot)  Lantus 100 units/mL subcutaneous solution: 25 to 30 unit(s) subcutaneous once a day (30-day supply last dispensed 3/29/24)  metFORMIN 1000 mg oral tablet: 1 tab(s) orally 2 times a day  ocular lubricant ophthalmic solution: 1 drop(s) to each affected eye 5 times a day  Rybelsus 14 mg oral tablet: 1 tab(s) orally once a day   acetaminophen 325 mg oral tablet: 2 tab(s) orally every 6 hours As needed Temp greater or equal to 38C (100.4F), Mild Pain (1 - 3)  benztropine 2 mg oral tablet: 1 tab(s) orally 2 times a day  haloperidol 5 mg oral tablet: 1 tab(s) orally 2 times a day  ketoconazole 2% topical cream: Apply topically to affected area 2 times a day (left foot)  Lantus Solostar Pen 100 units/mL subcutaneous solution: 18 unit(s) subcutaneous once a day (at bedtime)  metFORMIN 1000 mg oral tablet: 1 tab(s) orally 2 times a day  ocular lubricant ophthalmic solution: 1 drop(s) to each affected eye 5 times a day  predniSONE 20 mg oral tablet: 1 tab(s) orally every 24 hours  Rybelsus 14 mg oral tablet: 1 tab(s) orally once a day  valACYclovir 1 g oral tablet: 1 tab(s) orally 3 times a day   acetaminophen 325 mg oral tablet: 2 tab(s) orally every 6 hours As needed Temp greater or equal to 38C (100.4F), Mild Pain (1 - 3)  benztropine 2 mg oral tablet: 1 tab(s) orally 2 times a day  Glucagon Emergency Kit for Low Blood Sugar 1 mg injection: 1 milligram(s) intramuscular once as needed for severe hypoglycemia, then call 911.  haloperidol 5 mg oral tablet: 1 tab(s) orally 2 times a day  ketoconazole 2% topical cream: Apply topically to affected area 2 times a day (left foot)  Lantus Solostar Pen 100 units/mL subcutaneous solution: 18 unit(s) subcutaneous once a day (at bedtime)  metFORMIN 1000 mg oral tablet: 1 tab(s) orally 2 times a day  ocular lubricant ophthalmic solution: 1 drop(s) to each affected eye 5 times a day  predniSONE 20 mg oral tablet: 3 tab(s) orally once a day  Rybelsus 14 mg oral tablet: 1 tab(s) orally once a day  valACYclovir 1 g oral tablet: 1 tab(s) orally 3 times a day   acetaminophen 325 mg oral tablet: 2 tab(s) orally every 6 hours As needed Temp greater or equal to 38C (100.4F), Mild Pain (1 - 3)  alcohol swabs: Apply topically to affected area 4 times a day  benztropine 2 mg oral tablet: 1 tab(s) orally 2 times a day  haloperidol 5 mg oral tablet: 1 tab(s) orally 2 times a day  Insulin Pen Needles, 4mm: 1 application subcutaneously 4 times a day. ** Use with insulin pen **  ketoconazole 2% topical cream: Apply topically to affected area 2 times a day (left foot)  lancets: 1 application subcutaneously 4 times a day  Lantus Solostar Pen 100 units/mL subcutaneous solution: 18 unit(s) subcutaneous once a day (at bedtime)  metFORMIN 1000 mg oral tablet: 1 tab(s) orally 2 times a day  ocular lubricant ophthalmic solution: 1 drop(s) to each affected eye 5 times a day  predniSONE 20 mg oral tablet: 3 tab(s) orally once a day  Rybelsus 14 mg oral tablet: 1 tab(s) orally once a day  test strips (per patient&#x27;s insurance): 1 application subcutaneously 4 times a day. ** Compatible with patient&#x27;s glucometer **  valACYclovir 1 g oral tablet: 1 tab(s) orally 3 times a day   acetaminophen 325 mg oral tablet: 2 tab(s) orally every 6 hours As needed Temp greater or equal to 38C (100.4F), Mild Pain (1 - 3)  alcohol swabs: Apply topically to affected area 4 times a day  benztropine 2 mg oral tablet: 1 tab(s) orally 2 times a day  haloperidol 5 mg oral tablet: 1 tab(s) orally 2 times a day  Insulin Pen Needles, 4mm: 1 application subcutaneously 4 times a day. ** Use with insulin pen **  ketoconazole 2% topical cream: Apply topically to affected area 2 times a day (left foot)  lancets: 1 application subcutaneously 4 times a day  Lantus Solostar Pen 100 units/mL subcutaneous solution: 18 unit(s) subcutaneous once a day (at bedtime)  metFORMIN 1000 mg oral tablet: 1 tab(s) orally 2 times a day  ocular lubricant ophthalmic solution: 1 drop(s) to each affected eye 5 times a day  predniSONE 20 mg oral tablet: 3 tab(s) orally once a day  Rybelsus 14 mg oral tablet: 1 tab(s) orally once a day  test strips (per patient&#x27;s insurance): 1 application subcutaneously 4 times a day. ** * CONTOUR NEXT  valACYclovir 1 g oral tablet: 1 tab(s) orally 3 times a day  valACYclovir 1 g oral tablet: 1 tab(s) orally 3 times a day

## 2024-04-09 NOTE — DISCHARGE NOTE PROVIDER - NSDCCPCAREPLAN_GEN_ALL_CORE_FT
PRINCIPAL DISCHARGE DIAGNOSIS  Diagnosis: Diabetes mellitus with hyperglycemia  Assessment and Plan of Treatment: You presented with elevated glucose. You were seen by the endocrinologist and started on insulin, please continue insulin as prescribed.      SECONDARY DISCHARGE DIAGNOSES  Diagnosis: Bell's palsy  Assessment and Plan of Treatment: You received medications for facial nerve palsy. Your CT head was negative, please continue medications as prescribed.

## 2024-04-09 NOTE — DISCHARGE NOTE NURSING/CASE MANAGEMENT/SOCIAL WORK - PATIENT PORTAL LINK FT
Monitor Summary: Afib 53-84, ME .-, QRS .08, QT .46 with rare pvc per strip from monitor room    You can access the FollowMyHealth Patient Portal offered by [facility] by registering at the following website: [email or web address]. By joining [facility]’s FollowMyHealth portal, you will also be able to view your health information using other applications (apps) compatible with our system.

## 2024-04-09 NOTE — SWALLOW BEDSIDE ASSESSMENT ADULT - ORAL PHASE
patient reports oral residue post swallow and utilizes lingual sweep to clear Within functional limits

## 2024-04-09 NOTE — PROGRESS NOTE ADULT - PROBLEM SELECTOR PLAN 1
Presents with facial droop noted by mother at 4AM on day of presentation, these symptoms followed a headache  Left facial weakness involving forehead noted on exam  CT head negative  c/w prednisone 60mg daily and valacyclovir 1000mg TID x1 week

## 2024-04-09 NOTE — SWALLOW BEDSIDE ASSESSMENT ADULT - SWALLOW EVAL: RECOMMENDED FEEDING/EATING TECHNIQUES
slow rate of intake; small single cup sips/maintain upright posture during/after eating for 30 mins/no straws/oral hygiene/position upright (90 degrees)/small sips/bites

## 2024-04-09 NOTE — PROGRESS NOTE ADULT - ASSESSMENT
33M PMH T2DM, bipolar disorder, intellectual delay presents with several hours of left-sided facial droop with concern for bells palsy  endocrine called for uncontrolled DM   a1c 10.1  (high risk patient with severely uncontrolled diabetes with A1c of 10.1 with severe hyperglycemia with glucose values > 300's at high risk of CAD and CVA with high level decision-making).    pt is a limited informant regarding DM and hx and medications  mother is also a limited informant   transitions of care pharmacist able to obtain outpt meds include   Lantus 25 at night (never took insulin), RYBELSUS 14mg, metformin and glinperide 2mg (stopped)      Uncontrolled type 2 diabetes mellitus with hyperglycemia, with long-term current use of insulin.   While inpatient  -BG target 100-180 mg/dl: above goal. Pt denies eating in between meals   -currently on prednisone received 60mg at 12 am on 4/8 and another 20mg at 6p, then received 20mg at 12pm today (now ordered for an additional 40mg for a total of 60mg daily)    will need more bolus insulin >basal while on steroids ; as per primary team prednisone plan is 60mg for 5 more days a total of 6 days   - Increase Lantus to 18 units sq qhs   - Increase Admelog to 8 units SC Premeal/TIDAC   - (initially decreased to low scale for lunch) now back to Admelog moderate dose Correction Scale Premeal & seperate moderate dose  Correction Scale Bedtime   - FS before meals and at bedtime   - Carb Consistent Diet   - Nutrition consult   - Hypoglycemia protocol in place   - Provider to RN for diabetes/insulin pen teaching   - Transitions of care pharmacist following: please refer to pharmacotherapy note   - Please notify Endocrine if there is any change in steroid dosing     DC Medications  Pt no longer taking glimepiride as per pt, doctors office, and pharmacy)  While on steroids ideal plan would be basal/bolus (pt unable to handle administering basal/bolus); Therefore would recommend suboptimal plan for simplicity and adherence.  Please discharge pt on  Metformin 1000mg p.o. BID with meals, Rybelsus 14mg p.o. 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   Reviewed importance of medication adherence,  glucose monitoring, and following a consistent carb diet   Hypoglycemia and intervention   Please follow up with your pcp, podiatry, endocrinology, and opthalmology as an outpt   Please send Lantus solostar pen as test script to check insurance coverage.  Ok to send with current doses and update prior to d/c    If Lantus not covered- can try alternating with one of following   tresiba/basaglar/toujeo/Levemir  Ensure patient has working glucometer, test strips, lancets, alcohol pads, and BD awilda pen needles  For severe hypoglycemia: Please prescribe Gvoke HypoPen One Pack 1mG/0.2mL subcutaneous solution: 1 mG subcutaneously as needed for severe hypoglycemia or Baqsimi One Pack 3mG nasal powder: 3 mg (one actuation) into a single nostril as needed for severe hypoglycemia. If not covered, please prescribe Glucagon Emergency Kit for Low Blood Sugar 1 mG injection: 1 mG intramuscularly as needed for severe hypoglycemia. Glucose tablets 4G (take 4 tablets) or 15G tablets for blood sugar less than 70 mG/dL repeat fingerstick in 15 minutes. Patient and family will need instructions on proper use and to call 911 after use--? please check for price coverage  CM for safe dc plan advised pt needs a visiting nurse set up prior to discharge   If pt wishes he can follow up with [facility] at [place]; [address]; [place] [address]; [phone]    Essential hypertension.   Goal BP < 130/80  Stable off bp meds   outpt mc/cr ratio  defer to primary team     Hyperlipidemia  check lipid panel  defer to primary team     prob 4  hypothyroidism noted in chart  per med recc by Dr. Mercado, pt has not been dispensed levoT  check TSH 0.47 (WNL)    D/w Kristen Torrez  Nurse Practitioner  Division of Endocrinology & Diabetes  In house pager #[phone]    If before 9AM or after 6PM, or on weekends/holidays, please call endocrine answering service for assistance ([phone]).For nonurgent matters email [email or web address] for assistance.  33M PMH T2DM, bipolar disorder, intellectual delay presents with several hours of left-sided facial droop with concern for bells palsy  endocrine called for uncontrolled DM   a1c 10.1  (high risk patient with severely uncontrolled diabetes with A1c of 10.1 with severe hyperglycemia with glucose values > 300's at high risk of CAD and CVA with high level decision-making).    pt is a limited informant regarding DM and hx and medications  mother is also a limited informant   transitions of care pharmacist able to obtain outpt meds include   Lantus 25 at night (never took insulin), RYBELSUS 14mg, metformin and glinperide 2mg (stopped)      Uncontrolled type 2 diabetes mellitus with hyperglycemia, with long-term current use of insulin.   While inpatient  -BG target 100-180 mg/dl: above goal. Pt denies eating in between meals   -currently on prednisone received 60mg at 12 am on 4/8 and another 20mg at 6p, then received 20mg at 12pm today (now ordered for an additional 40mg for a total of 60mg daily)    will need more bolus insulin >basal while on steroids ; as per primary team prednisone plan is 60mg for 5 more days a total of 6 days   - Increase Lantus to 18 units sq qhs   - Increase Admelog to 8 units SC Premeal/TIDAC   - (initially decreased to low scale for lunch) now back to Admelog moderate dose Correction Scale Premeal & seperate moderate dose  Correction Scale Bedtime   - FS before meals and at bedtime   - Carb Consistent Diet   - Nutrition consult   - Hypoglycemia protocol in place   - Provider to RN for diabetes/insulin pen teaching   - Transitions of care pharmacist following: please refer to pharmacotherapy note   - Please notify Endocrine if there is any change in steroid dosing     DC Medications  Pt no longer taking glimepiride as per pt, doctors office, and pharmacy)  While on steroids ideal plan would be basal/bolus (pt unable to handle administering basal/bolus); Therefore would recommend suboptimal plan for simplicity and adherence.  Please discharge pt on  Metformin 1000mg p.o. BID with meals, Rybelsus 14mg p.o. 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   Reviewed importance of medication adherence,  glucose monitoring, and following a consistent carb diet   Hypoglycemia and intervention   Please follow up with your pcp, podiatry, endocrinology, and opthalmology as an outpt   Please send Lantus solostar pen as test script to check insurance coverage.  Ok to send with current doses and update prior to d/c    If Lantus not covered- can try alternating with one of following   tresiba/basaglar/toujeo/Levemir  Ensure patient has working glucometer, test strips, lancets, alcohol pads, and BD awilda pen needles  For severe hypoglycemia: Please prescribe Gvoke HypoPen One Pack 1mG/0.2mL subcutaneous solution: 1 mG subcutaneously as needed for severe hypoglycemia or Baqsimi One Pack 3mG nasal powder: 3 mg (one actuation) into a single nostril as needed for severe hypoglycemia. If not covered, please prescribe Glucagon Emergency Kit for Low Blood Sugar 1 mG injection: 1 mG intramuscularly as needed for severe hypoglycemia. Glucose tablets 4G (take 4 tablets) or 15G tablets for blood sugar less than 70 mG/dL repeat fingerstick in 15 minutes. Patient and family will need instructions on proper use and to call 911 after use--? please check for price coverage  CM for safe dc plan advised pt needs a visiting nurse set up prior to discharge   Pt has a PCP and Endocrinologist [facility] advised pt to call for appointments for both Endocrine and Medicine upon discharge; [phone]( this number was given to family friend as well); called and spoke with Medical Assistant  D/w Family Friend at bedside who is helping pt and his family   D/w Uncle  [phone] who will also be assisting pt and family     Essential hypertension.   Goal BP < 130/80  Stable off bp meds   outpt mc/cr ratio  defer to primary team     Hyperlipidemia  check lipid panel  defer to primary team     prob 4  hypothyroidism noted in chart  per med recc by Dr. Mercado, pt has not been dispensed levoT  check TSH 0.47 (WNL)    D/w Kristen Torrez  Nurse Practitioner  Division of Endocrinology & Diabetes  In house pager #[phone]    If before 9AM or after 6PM, or on weekends/holidays, please call endocrine answering service for assistance ([phone]).For nonurgent matters email [email or web address] for assistance.

## 2024-04-09 NOTE — DISCHARGE NOTE PROVIDER - HOSPITAL COURSE
33M PMH T2DM, bipolar disorder, intellectual delay presents with several hours of left-sided facial droop. Found to have A1c>10%. He is admitted for evaluation of a new facial droop and for uncontrolled diabetes. CT head was negative. Symptoms consistent with Bell's palsy, patient given prednisone and valacyclovir. He was seen by endocrinology, recommended for insulin, glucose improved. Patient discharged home on insulin for uncontrolled DM and prednisone/valacyclovir for [place] palsy to complete 7 days.

## 2024-04-09 NOTE — PROGRESS NOTE ADULT - TIME BILLING
Time-based billing (NON-critical care).     50 minutes spent on total encounter; more than 50% of the visit was spent counseling and / or coordinating care by the attending physician.  The necessity of the time spent during the encounter on this date of service was due to:     documentation in [place], reviewing chart and coordinating care with patient/ACP and interdisciplinary staff (such as , social workers, etc) as well as reviewing vitals, laboratory data, radiology, medication list, consultants' recommendations and prior records. Interventions were performed as documented above.

## 2024-04-09 NOTE — DISCHARGE NOTE PROVIDER - NSDCCPTREATMENT_GEN_ALL_CORE_FT
PRINCIPAL PROCEDURE  Procedure: CT head  Findings and Treatment: FINDINGS:  No hydrocephalus, mass effect, midline shift, acute intracranial   hemorrhage, or brain edema.  Visualized paranasal sinuses and mastoid air cells are clear.  IMPRESSION:  No hydrocephalus, acute intracranial hemorrhage, mass effect, or brain   edema.

## 2024-04-09 NOTE — SWALLOW BEDSIDE ASSESSMENT ADULT - ASR SWALLOW RECOMMEND DIAG
Objective testing Not warranted at this time given patient sensate with cough x1 on consecutive cup sips of thin liquids via straw which was not observed on single cup sips and no recent chest imaging.

## 2024-04-09 NOTE — SWALLOW BEDSIDE ASSESSMENT ADULT - COMMENTS
H&P 4/8, "33M PMH T2DM, bipolar disorder, intellectual delay presents with several hours of left-sided facial droop. Found to have A1c>10%. He is admitted for evaluation of a new facial droop and for uncontrolled diabetes."    CT Head 4/8: IMPRESSION: No hydrocephalus, acute intracranial hemorrhage, mass effect, or brain edema.    No recent chest imaging. CXR 3/31 (previous admission): IMPRESSION: Clear lungs.    Patient received outside of room walking around. Patient brought back to room and seated at edge of bed. Patient reported previous neck discomfort yesterday but since resolved. Denies difficulty with eating/ drinking. Patient's mother and family friend present. Patient able to make wants/ needs known and follow simple directives.

## 2024-04-09 NOTE — PROGRESS NOTE ADULT - SUBJECTIVE AND OBJECTIVE BOX
Chief Complaint: Type 2 DM; steroid induced hyperglycemia    History: Pt seen at bedside. Pt tolerating oral diet. Pt denies nausea and vomiting/any signs of hypoglycemia. Pt reports an adequate appetite.     MEDICATIONS  (STANDING):  artificial tears (preservative free) Ophthalmic Solution 1 Drop(s) Left EYE five times a day  benztropine 2 milliGRAM(s) Oral two times a day  dextrose 10% Bolus 125 milliLiter(s) IV Bolus once  dextrose 5%. 1000 milliLiter(s) (100 mL/Hr) IV Continuous <Continuous>  dextrose 5%. 1000 milliLiter(s) (50 mL/Hr) IV Continuous <Continuous>  dextrose 50% Injectable 25 Gram(s) IV Push once  dextrose 50% Injectable 12.5 Gram(s) IV Push once  glucagon  Injectable 1 milliGRAM(s) IntraMuscular once  haloperidol     Tablet 5 milliGRAM(s) Oral two times a day  influenza   Vaccine 0.5 milliLiter(s) IntraMuscular once  insulin glargine Injectable (LANTUS) 15 Unit(s) SubCutaneous at bedtime  insulin lispro (ADMELOG) corrective regimen sliding scale   SubCutaneous three times a day before meals  insulin lispro (ADMELOG) corrective regimen sliding scale   SubCutaneous at bedtime  insulin lispro Injectable (ADMELOG) 8 Unit(s) SubCutaneous three times a day before meals  predniSONE   Tablet 40 milliGRAM(s) Oral once  predniSONE   Tablet 20 milliGRAM(s) Oral every 24 hours  valACYclovir 1000 milliGRAM(s) Oral three times a day    MEDICATIONS  (PRN):  acetaminophen     Tablet .. 650 milliGRAM(s) Oral every 6 hours PRN Temp greater or equal to 38C (100.4F), Mild Pain (1 - 3)  dextrose Oral Gel 15 Gram(s) Oral once PRN Blood Glucose LESS THAN 70 milliGRAM(s)/deciliter      Allergies: No Known Allergies    Review of Systems:  Respiratory: No SOB, no cough  GI: No nausea, vomiting, abdominal pain  Endocrine: no polyuria, polydipsia    PHYSICAL EXAM:  VITALS: T(C): 36.9 (04-09-24 @ 10:00)  T(F): 98.5 (04-09-24 @ 10:00), Max: 98.5 (04-09-24 @ 10:00)  HR: 105 (04-09-24 @ 10:00) (78 - 105)  BP: 122/83 (04-09-24 @ 10:00) (103/64 - 127/84)  RR:  (17 - 18)  SpO2:  (96% - 100%)  Wt(kg): --  GENERAL: NAD, well-groomed, well-developed  RESPIRATORY: No labored breathing   GI: Soft, nontender, non distended  PSYCH: Alert and oriented x 3, normal affect, normal mood      CAPILLARY BLOOD GLUCOSE  POCT Blood Glucose.: 259 mg/dL (09 Apr 2024 12:09)  POCT Blood Glucose.: 233 mg/dL (09 Apr 2024 08:20)  POCT Blood Glucose.: 135 mg/dL (08 Apr 2024 22:47)  POCT Blood Glucose.: 205 mg/dL (08 Apr 2024 18:18)    A1C with Estimated Average Glucose (04.08.24 @ 11:10)    A1C with Estimated Average Glucose Result: 10.3   Estimated Average Glucose: 249      04-09    135  |  98  |  10  ----------------------------<  273<H>  4.1   |  22  |  0.76    eGFR: 122    Ca    9.5      04-09    TPro  7.9  /  Alb  4.5  /  TBili  0.6  /  DBili  x   /  AST  28  /  ALT  31  /  AlkPhos  129<H>  04-08    Thyroid Function Tests:  04-09 @ 06:35 TSH 0.47 FreeT4 -- T3 -- Anti TPO -- Anti Thyroglobulin Ab -- TSI --    Diet, Consistent Carbohydrate/No Snacks (04-08-24 @ 10:12) [Active]                      
[facility] Division of Hospital Medicine  Bridget Lang MD  Available via MS Teams  Pager: [phone]    SUBJECTIVE / OVERNIGHT EVENTS:    Tajik  [id number]  pt denies any complaints, thinks his facial palsy is improving  mother at bedside- discussed insulin with  pt and mother feels comfortable administering insulin    MEDICATIONS  (STANDING):  artificial tears (preservative free) Ophthalmic Solution 1 Drop(s) Left EYE five times a day  benztropine 2 milliGRAM(s) Oral two times a day  dextrose 10% Bolus 125 milliLiter(s) IV Bolus once  dextrose 5%. 1000 milliLiter(s) (100 mL/Hr) IV Continuous <Continuous>  dextrose 5%. 1000 milliLiter(s) (50 mL/Hr) IV Continuous <Continuous>  dextrose 50% Injectable 25 Gram(s) IV Push once  dextrose 50% Injectable 12.5 Gram(s) IV Push once  glucagon  Injectable 1 milliGRAM(s) IntraMuscular once  haloperidol     Tablet 5 milliGRAM(s) Oral two times a day  influenza   Vaccine 0.5 milliLiter(s) IntraMuscular once  insulin glargine Injectable (LANTUS) 15 Unit(s) SubCutaneous at bedtime  insulin lispro (ADMELOG) corrective regimen sliding scale   SubCutaneous at bedtime  insulin lispro (ADMELOG) corrective regimen sliding scale   SubCutaneous three times a day before meals  insulin lispro Injectable (ADMELOG) 8 Unit(s) SubCutaneous three times a day before meals  predniSONE   Tablet 20 milliGRAM(s) Oral every 24 hours  predniSONE   Tablet 40 milliGRAM(s) Oral once  valACYclovir 1000 milliGRAM(s) Oral three times a day    MEDICATIONS  (PRN):  acetaminophen     Tablet .. 650 milliGRAM(s) Oral every 6 hours PRN Temp greater or equal to 38C (100.4F), Mild Pain (1 - 3)  dextrose Oral Gel 15 Gram(s) Oral once PRN Blood Glucose LESS THAN 70 milliGRAM(s)/deciliter      I&O's Summary      PHYSICAL EXAM:  Vital Signs Last 24 Hrs  T(C): 36.9 (09 Apr 2024 10:00), Max: 36.9 (09 Apr 2024 10:00)  T(F): 98.5 (09 Apr 2024 10:00), Max: 98.5 (09 Apr 2024 10:00)  HR: 105 (09 Apr 2024 10:00) (78 - 105)  BP: 122/83 (09 Apr 2024 10:00) (103/64 - 127/84)  BP(mean): 98 (08 Apr 2024 17:07) (98 - 98)  RR: 18 (09 Apr 2024 10:00) (17 - 18)  SpO2: 100% (09 Apr 2024 10:00) (96% - 100%)    Parameters below as of 09 Apr 2024 05:18  Patient On (Oxygen Delivery Method): room air      CONSTITUTIONAL: NAD  EYES: PERRLA; conjunctiva and sclera clear  ENMT: Moist oral mucosa, no pharyngeal injection or exudates  NECK: Supple, no palpable masses  RESPIRATORY: Normal respiratory effort; lungs are clear to auscultation bilaterally  CARDIOVASCULAR: Regular rate and rhythm, normal S1 and S2, no murmur/rub/gallop; No lower extremity edema; Peripheral pulses are 2+ bilaterally  ABDOMEN: Nontender to palpation, normoactive bowel sounds, no rebound/guarding  MUSCULOSKELETAL:  no clubbing or cyanosis of digits; no joint swelling or tenderness to palpation  NEUROLOGY: CN 2-12 are intact and symmetric; no gross sensory deficits   SKIN: No rashes; no palpable lesions    LABS:                        13.0   6.16  )-----------( 287      ( 09 Apr 2024 06:35 )             36.4     04-09    135  |  98  |  10  ----------------------------<  273<H>  4.1   |  22  |  0.76    Ca    9.5      09 Apr 2024 06:35    TPro  7.9  /  Alb  4.5  /  TBili  0.6  /  DBili  x   /  AST  28  /  ALT  31  /  AlkPhos  129<H>  04-08          Urinalysis Basic - ( 09 Apr 2024 06:35 )    Color: x / Appearance: x / SG: x / pH: x  Gluc: 273 mg/dL / Ketone: x  / Bili: x / Urobili: x   Blood: x / Protein: x / Nitrite: x   Leuk Esterase: x / RBC: x / WBC x   Sq Epi: x / Non Sq Epi: x / Bacteria: x

## 2024-04-09 NOTE — PROGRESS NOTE ADULT - PROBLEM SELECTOR PLAN 2
A1c>10%  Endocrinology team consulted given uncontrolled T2DM w/hyperglycemia  Hold oral agents while admitted, will c/w glargine 14U hs, ISS  f/u endocrinology recommendations  -insulin on dc  -home care on dc A1c>10%  Endocrinology team consulted given uncontrolled T2DM w/hyperglycemia  Hold oral agents while admitted, will c/w glargine 14U hs, ISS  f/u endocrinology recommendations for dc  -insulin on dc- discussed with pt and family  -home care on dc- sw working on home care

## 2024-04-09 NOTE — SWALLOW BEDSIDE ASSESSMENT ADULT - SWALLOW EVAL: DIAGNOSIS
1. Mild oral dysphagia for puree, regular solids and thin liquids characterized by reduced labial seal to strip PO from utensil, adequate mastication of regular solids adequate anterior to posterior transport with patient reporting diffuse oral residue post swallow for regular solids and utilizing lingual sweep to clear. 2. Functional pharyngeal stage suspected for puree and regular solids characterized by initiation of the pharyngeal swallow and hyolaryngeal excursion upon digital palpation with no overt s/s penetration/ aspiration noted. 3. Mild pharyngeal dysphagia suspected for thin liquids characterized by initiation of the pharyngeal swallow and hyolaryngeal excursion upon digital palpation with cough noted x1 on consecutive cup sips via straw; no overt s/s penetration/ aspiration noted on single cup sips of thin liquids.

## 2024-06-02 NOTE — ED PROVIDER NOTE - NSCAREINITIATED _GEN_ER
Cecilio Whitaker(Attending)
Diet, Regular:   Soft and Bite Sized (SOFTBTSZ)  Supplement Feeding Modality:  Oral  Ensure Clear Cans or Servings Per Day:  1       Frequency:  Three Times a day (06-01-24 @ 16:35)

## 2024-11-01 ENCOUNTER — EMERGENCY (EMERGENCY)
Facility: HOSPITAL | Age: 34
LOS: 1 days | Discharge: HOME | End: 2024-11-01
Attending: STUDENT IN AN ORGANIZED HEALTH CARE EDUCATION/TRAINING PROGRAM | Admitting: STUDENT IN AN ORGANIZED HEALTH CARE EDUCATION/TRAINING PROGRAM
Payer: MEDICAID

## 2024-11-01 VITALS
DIASTOLIC BLOOD PRESSURE: 77 MMHG | OXYGEN SATURATION: 98 % | TEMPERATURE: 98 F | HEART RATE: 119 BPM | RESPIRATION RATE: 18 BRPM | SYSTOLIC BLOOD PRESSURE: 113 MMHG | HEIGHT: 60 IN | WEIGHT: 119.93 LBS

## 2024-11-01 LAB
ALBUMIN SERPL ELPH-MCNC: 4.3 G/DL — SIGNIFICANT CHANGE UP (ref 3.3–5)
ALP SERPL-CCNC: 113 U/L — SIGNIFICANT CHANGE UP (ref 40–120)
ALT FLD-CCNC: 25 U/L — SIGNIFICANT CHANGE UP (ref 4–41)
ANION GAP SERPL CALC-SCNC: 13 MMOL/L — SIGNIFICANT CHANGE UP (ref 7–14)
AST SERPL-CCNC: 27 U/L — SIGNIFICANT CHANGE UP (ref 4–40)
BASOPHILS # BLD AUTO: 0.03 K/UL — SIGNIFICANT CHANGE UP (ref 0–0.2)
BASOPHILS NFR BLD AUTO: 0.7 % — SIGNIFICANT CHANGE UP (ref 0–2)
BILIRUB SERPL-MCNC: 0.6 MG/DL — SIGNIFICANT CHANGE UP (ref 0.2–1.2)
BUN SERPL-MCNC: 9 MG/DL — SIGNIFICANT CHANGE UP (ref 7–23)
CALCIUM SERPL-MCNC: 9.1 MG/DL — SIGNIFICANT CHANGE UP (ref 8.4–10.5)
CHLORIDE SERPL-SCNC: 98 MMOL/L — SIGNIFICANT CHANGE UP (ref 98–107)
CO2 SERPL-SCNC: 24 MMOL/L — SIGNIFICANT CHANGE UP (ref 22–31)
CREAT SERPL-MCNC: 0.99 MG/DL — SIGNIFICANT CHANGE UP (ref 0.5–1.3)
EGFR: 103 ML/MIN/1.73M2 — SIGNIFICANT CHANGE UP
EOSINOPHIL # BLD AUTO: 0.14 K/UL — SIGNIFICANT CHANGE UP (ref 0–0.5)
EOSINOPHIL NFR BLD AUTO: 3.3 % — SIGNIFICANT CHANGE UP (ref 0–6)
FLUAV AG NPH QL: SIGNIFICANT CHANGE UP
FLUBV AG NPH QL: SIGNIFICANT CHANGE UP
GLUCOSE SERPL-MCNC: 187 MG/DL — HIGH (ref 70–99)
HCT VFR BLD CALC: 38.2 % — LOW (ref 39–50)
HETEROPH AB TITR SER AGGL: NEGATIVE — SIGNIFICANT CHANGE UP
HGB BLD-MCNC: 13.2 G/DL — SIGNIFICANT CHANGE UP (ref 13–17)
IANC: 2.05 K/UL — SIGNIFICANT CHANGE UP (ref 1.8–7.4)
IMM GRANULOCYTES NFR BLD AUTO: 0.2 % — SIGNIFICANT CHANGE UP (ref 0–0.9)
LYMPHOCYTES # BLD AUTO: 1.46 K/UL — SIGNIFICANT CHANGE UP (ref 1–3.3)
LYMPHOCYTES # BLD AUTO: 34.3 % — SIGNIFICANT CHANGE UP (ref 13–44)
MCHC RBC-ENTMCNC: 28.8 PG — SIGNIFICANT CHANGE UP (ref 27–34)
MCHC RBC-ENTMCNC: 34.6 G/DL — SIGNIFICANT CHANGE UP (ref 32–36)
MCV RBC AUTO: 83.2 FL — SIGNIFICANT CHANGE UP (ref 80–100)
MONOCYTES # BLD AUTO: 0.57 K/UL — SIGNIFICANT CHANGE UP (ref 0–0.9)
MONOCYTES NFR BLD AUTO: 13.4 % — SIGNIFICANT CHANGE UP (ref 2–14)
NEUTROPHILS # BLD AUTO: 2.05 K/UL — SIGNIFICANT CHANGE UP (ref 1.8–7.4)
NEUTROPHILS NFR BLD AUTO: 48.1 % — SIGNIFICANT CHANGE UP (ref 43–77)
NRBC # BLD: 0 /100 WBCS — SIGNIFICANT CHANGE UP (ref 0–0)
NRBC # FLD: 0 K/UL — SIGNIFICANT CHANGE UP (ref 0–0)
PLATELET # BLD AUTO: 257 K/UL — SIGNIFICANT CHANGE UP (ref 150–400)
POTASSIUM SERPL-MCNC: 3.6 MMOL/L — SIGNIFICANT CHANGE UP (ref 3.5–5.3)
POTASSIUM SERPL-SCNC: 3.6 MMOL/L — SIGNIFICANT CHANGE UP (ref 3.5–5.3)
PROT SERPL-MCNC: 7.5 G/DL — SIGNIFICANT CHANGE UP (ref 6–8.3)
RBC # BLD: 4.59 M/UL — SIGNIFICANT CHANGE UP (ref 4.2–5.8)
RBC # FLD: 12.2 % — SIGNIFICANT CHANGE UP (ref 10.3–14.5)
RSV RNA NPH QL NAA+NON-PROBE: SIGNIFICANT CHANGE UP
SARS-COV-2 RNA SPEC QL NAA+PROBE: SIGNIFICANT CHANGE UP
SODIUM SERPL-SCNC: 135 MMOL/L — SIGNIFICANT CHANGE UP (ref 135–145)
TSH SERPL-MCNC: 0.9 UIU/ML — SIGNIFICANT CHANGE UP (ref 0.27–4.2)
WBC # BLD: 4.26 K/UL — SIGNIFICANT CHANGE UP (ref 3.8–10.5)
WBC # FLD AUTO: 4.26 K/UL — SIGNIFICANT CHANGE UP (ref 3.8–10.5)

## 2024-11-01 PROCEDURE — 99285 EMERGENCY DEPT VISIT HI MDM: CPT

## 2024-11-01 PROCEDURE — 93010 ELECTROCARDIOGRAM REPORT: CPT

## 2024-11-01 RX ORDER — ACETAMINOPHEN 500 MG
2 TABLET ORAL
Qty: 18 | Refills: 0
Start: 2024-11-01 | End: 2024-11-03

## 2024-11-01 RX ORDER — ACETAMINOPHEN 500 MG
1000 TABLET ORAL ONCE
Refills: 0 | Status: COMPLETED | OUTPATIENT
Start: 2024-11-01 | End: 2024-11-01

## 2024-11-01 RX ORDER — IBUPROFEN 200 MG
1 TABLET ORAL
Qty: 12 | Refills: 0
Start: 2024-11-01 | End: 2024-11-03

## 2024-11-01 RX ORDER — DEXAMETHASONE 1.5 MG 1.5 MG/1
10 TABLET ORAL ONCE
Refills: 0 | Status: COMPLETED | OUTPATIENT
Start: 2024-11-01 | End: 2024-11-01

## 2024-11-01 RX ADMIN — DEXAMETHASONE 1.5 MG 102 MILLIGRAM(S): 1.5 TABLET ORAL at 18:17

## 2024-11-01 RX ADMIN — Medication 400 MILLIGRAM(S): at 17:54

## 2024-11-01 RX ADMIN — Medication 1000 MILLILITER(S): at 17:54

## 2024-11-01 NOTE — ED PROVIDER NOTE - NSFOLLOWUPINSTRUCTIONS_ED_ALL_ED_FT
There were no signs of an emergency medical condition on completion of today's workup.  You will need further medical care and evaluation.     Your pain is likely secondary to uvulitis, which is swelling of the uvula. You can take tylenol and ibuprofen for pain. This will resolve in a few days.   Follow up with your medical doctor in 2-3 days or call our clinic at [phone] and state you were seen in the Emergency Department and would like to be seen in clinic. You may also call [phone] to speak with a representative to assist follow up care with medicine, surgery, or specialists.    If you are having pain, take Tylenol/acetaminophen 1 g every six hours and supplement (if allowed by your physician, and if you're not having gastric/gastrointestinal/stomach/intestinal problems) with ibuprofen 600 mg, with food, every six hours which can be taken three hours apart from the Tylenol to have a layered effect.     Be sure to take no more than 4000mg or 4g of Tylenol/acetaminophen in a 24 hour period. Be sure to check your other medications to see if they include Tylenol/acetaminophen and include them in your calculations to ensure you do not take more than 4000mg or 4g of Tylenol/acetaminophen a day.    If you are unable to swallow/eat, have difficulty breathing, develop nausea/vomiting, or have uncontrolled pain, please return to the emergency room.    Return for any persistent, worsening symptoms, or ANY concerns at all.

## 2024-11-01 NOTE — ED PROVIDER NOTE - OBJECTIVE STATEMENT
Monet Xiao is a 35yo male PMH T2DM, HTN, bipolar disorder, developmental delay presenting with 8 days of throat/lateral jaw pain and dysphagia. He has had trouble Monet Xiao is a 35yo male PMH T2DM, HTN, bipolar disorder, developmental delay presenting with 8 days of throat/lateral jaw pain and dysphagia. He has had trouble swallowing food and liquids. Some of the food goes down; however, he is spitting up the food and water. He states this has happened before in the past, but does not remember how it was evaluated or treated. He was told his thyroid was "large" but unsure if he was diagnosed with anything thyroid related. He denies fever, URI symptoms, CP, SOB, n/v, abdominal pain, urinary symptoms, constipation/diarrhea. Denies trauma to the affected areas.

## 2024-11-01 NOTE — ED PROVIDER NOTE - CLINICAL SUMMARY MEDICAL DECISION MAKING FREE TEXT BOX
Faviola, PGY1: Monet Xiao is a 33yo male PMH T2DM, HTN, bipolar disorder, developmental delay presenting with 8 days of throat/lateral jaw pain and dysphagia. He has had trouble swallowing food and liquids. His HEENT is negative and there are no overlying skin changes or masses around the neck appreciated on exam. Will get basic labs and CT neck to evaluate for mass or neck pathology that may be causing his symptoms. Will also get EKG and troponin with c/f atypical CP and tachycardia on presentation. Patient unable to tolerate PO for 1 week, which may explain tachycardiac. Will give IVF and pain medications. Faviola, PGY1: Monet Xiao is a 33yo male PMH T2DM, HTN, bipolar disorder, developmental delay presenting with 8 days of throat/lateral jaw pain and dysphagia. He has had trouble swallowing food and liquids. His uvula appear swollen with some posterior pharynx erythema c/f uvulitis. Will give decadron, tylenol for symptom relief. Will test for mono and flu/covid/rsv. Patient unable to tolerate PO for 1 week, which may explain tachycardia. Will give IVF

## 2024-11-01 NOTE — ED PROVIDER NOTE - PHYSICAL EXAMINATION
Const: Awake, alert, no acute distress.  Well appearing.  Moving comfortably on stretcher.  HEENT: NC/AT.  Moist mucous membranes.  No pharyngeal erythema, no exudates.  Eyes: Extraocular movements intact b/l.  Pupils equal, round, and reactive to light b/l.  Conjunctiva pink.  No scleral icterus.  Neck: Neck supple, full ROM without pain. No masses of lymphadenopathy   Cardiac: Regular rate and regular rhythm. S1 S2 present.   Resp: Speaking in full sentences. No evidence of respiratory distress.  Good air entry b/l, breath sounds clear to auscultation b/l.  Abd: Non-distended..  Soft, non-tender, no guarding, no rigidity, no rebound tenderness.  No palpable masses.  MSK: Normal ROM and strength throughout.  Skin: Normal coloration.  No rashes, abrasions or lacerations.  Neuro: Awake, alert & oriented x 3.  Moves all extremities spontaneously and symmetrically.  No focal deficits. Const: Awake, alert, no acute distress.  Well appearing.  Moving comfortably on stretcher.  HEENT: NC/AT.  Moist mucous membranes.  Swollen uvula, some posterior pharynx erythema.   Eyes: Extraocular movements intact b/l.  Pupils equal, round, and reactive to light b/l.  Conjunctiva pink.  No scleral icterus.  Neck: Neck supple, full ROM without pain. No masses of lymphadenopathy   Cardiac: Regular rate and regular rhythm. S1 S2 present.   Resp: Speaking in full sentences. No evidence of respiratory distress.  Good air entry b/l, breath sounds clear to auscultation b/l.  Abd: Non-distended..  Soft, non-tender, no guarding, no rigidity, no rebound tenderness.  No palpable masses.  MSK: Normal ROM and strength throughout.  Skin: Normal coloration.  No rashes, abrasions or lacerations.  Neuro: Awake, alert & oriented x 3.  Moves all extremities spontaneously and symmetrically.  No focal deficits.

## 2024-11-01 NOTE — ED PROVIDER NOTE - PATIENT PORTAL LINK FT
You can access the FollowMyHealth Patient Portal offered by [facility] by registering at the following website: [email or web address]. By joining [facility]’s FollowMyHealth portal, you will also be able to view your health information using other applications (apps) compatible with our system.

## 2024-11-01 NOTE — ED PROVIDER NOTE - NS ED MD DISPO DISCHARGE
[id number] [facility] - Tuesday, June 8, 2021 at 2:00pm. Arrive at 1:30pm, Entrance A. Faxed order to #[phone]. Mailed AVS to patient.  Matt Cespedes
Home

## 2024-11-01 NOTE — ED PROVIDER NOTE - ATTENDING CONTRIBUTION TO CARE
I personally made the management plan, and take responsibility for the patient's management. I have discussed with and reviewed the Resident's note and agree with the History, ROS, Physical Exam and MDM unless otherwise indicated. A brief summary of my personal evaluation and impression can be found below.    34-year-old male with a past medical history of intellectual disability, type 2 diabetes, hypertension, bipolar presenting due to concern of throat and jaw pain.  Patient states he has pain with swallowing.  Denies fever but has been experiencing an intermittent cough.  No chest pain or trouble breathing.  States he has a history of being told his thyroid is enlarged but had workup at that time which was unremarkable.    General: well-appearing, no acute distress  Head: Atraumatic, normocephalic  Eyes: EOM grossly in tact, no scleral icterus, no discharge  ENT: moist mucous membranes, uvula erythematous and enlarged, resting on posterior tongue, mild tonsillar erythema, no exudate  + anterior cervical LAD  Respiratory: CTAB, no wheezing, normal respiratory effort  CV: RRR, good s1/s2, no S3, Extremities warm and well perfused  Abdominal: Soft, non-distended, non-tender, no masses  Extremities: No edema, no deformities  Skin: warm and dry. No rashes    Differential diagnosis includes but is not limited to Pharyngitis, uvulitis, mono, strep.  Patient well-appearing has some erythema in his posterior oropharynx and swelling of his uvula, would treat with Decadron.  Will also check labs including a Monospot and give IV Tylenol and IV fluids.  Mucous membranes are moist, patient appears well-hydrated.  Given this lower concern that he has not been able to tolerate p.o. for the past week.  Likely would discharge with supportive care if labs and everything unremarkable.

## 2024-11-01 NOTE — ED ADULT TRIAGE NOTE - CHIEF COMPLAINT QUOTE
c/oo multiple episodes of n/V for the past 8 days, reports unable to tolerate solid food, 2 days ago developmental sore throat and pain, Phx fo DM type 2, Developmental delay, bipolar disorder, arrives with mother.

## 2024-11-27 ENCOUNTER — EMERGENCY (EMERGENCY)
Facility: HOSPITAL | Age: 34
LOS: 1 days | Discharge: HOME | End: 2024-11-27
Admitting: STUDENT IN AN ORGANIZED HEALTH CARE EDUCATION/TRAINING PROGRAM
Payer: MEDICAID

## 2024-11-27 VITALS
OXYGEN SATURATION: 97 % | RESPIRATION RATE: 18 BRPM | DIASTOLIC BLOOD PRESSURE: 85 MMHG | HEART RATE: 86 BPM | TEMPERATURE: 98 F | SYSTOLIC BLOOD PRESSURE: 112 MMHG

## 2024-11-27 VITALS
SYSTOLIC BLOOD PRESSURE: 131 MMHG | TEMPERATURE: 99 F | DIASTOLIC BLOOD PRESSURE: 81 MMHG | OXYGEN SATURATION: 98 % | HEIGHT: 60 IN | RESPIRATION RATE: 18 BRPM | HEART RATE: 98 BPM

## 2024-11-27 LAB
ALBUMIN SERPL ELPH-MCNC: 4.2 G/DL — SIGNIFICANT CHANGE UP (ref 3.3–5)
ALP SERPL-CCNC: 129 U/L — HIGH (ref 40–120)
ALT FLD-CCNC: 19 U/L — SIGNIFICANT CHANGE UP (ref 4–41)
ANION GAP SERPL CALC-SCNC: 9 MMOL/L — SIGNIFICANT CHANGE UP (ref 7–14)
AST SERPL-CCNC: 21 U/L — SIGNIFICANT CHANGE UP (ref 4–40)
BASOPHILS # BLD AUTO: 0.02 K/UL — SIGNIFICANT CHANGE UP (ref 0–0.2)
BASOPHILS NFR BLD AUTO: 0.4 % — SIGNIFICANT CHANGE UP (ref 0–2)
BILIRUB SERPL-MCNC: 0.5 MG/DL — SIGNIFICANT CHANGE UP (ref 0.2–1.2)
BLOOD GAS VENOUS COMPREHENSIVE RESULT: SIGNIFICANT CHANGE UP
BUN SERPL-MCNC: 11 MG/DL — SIGNIFICANT CHANGE UP (ref 7–23)
CALCIUM SERPL-MCNC: 8.9 MG/DL — SIGNIFICANT CHANGE UP (ref 8.4–10.5)
CHLORIDE SERPL-SCNC: 101 MMOL/L — SIGNIFICANT CHANGE UP (ref 98–107)
CO2 SERPL-SCNC: 26 MMOL/L — SIGNIFICANT CHANGE UP (ref 22–31)
CREAT SERPL-MCNC: 0.87 MG/DL — SIGNIFICANT CHANGE UP (ref 0.5–1.3)
EGFR: 117 ML/MIN/1.73M2 — SIGNIFICANT CHANGE UP
EGFR: 117 ML/MIN/1.73M2 — SIGNIFICANT CHANGE UP
EOSINOPHIL # BLD AUTO: 0.23 K/UL — SIGNIFICANT CHANGE UP (ref 0–0.5)
EOSINOPHIL NFR BLD AUTO: 4.9 % — SIGNIFICANT CHANGE UP (ref 0–6)
GLUCOSE SERPL-MCNC: 228 MG/DL — HIGH (ref 70–99)
HCT VFR BLD CALC: 36.2 % — LOW (ref 39–50)
HGB BLD-MCNC: 12.5 G/DL — LOW (ref 13–17)
IANC: 1.97 K/UL — SIGNIFICANT CHANGE UP (ref 1.8–7.4)
IMM GRANULOCYTES NFR BLD AUTO: 0.2 % — SIGNIFICANT CHANGE UP (ref 0–0.9)
LYMPHOCYTES # BLD AUTO: 1.72 K/UL — SIGNIFICANT CHANGE UP (ref 1–3.3)
LYMPHOCYTES # BLD AUTO: 36.5 % — SIGNIFICANT CHANGE UP (ref 13–44)
MCHC RBC-ENTMCNC: 29.1 PG — SIGNIFICANT CHANGE UP (ref 27–34)
MCHC RBC-ENTMCNC: 34.5 G/DL — SIGNIFICANT CHANGE UP (ref 32–36)
MCV RBC AUTO: 84.2 FL — SIGNIFICANT CHANGE UP (ref 80–100)
MONOCYTES # BLD AUTO: 0.76 K/UL — SIGNIFICANT CHANGE UP (ref 0–0.9)
MONOCYTES NFR BLD AUTO: 16.1 % — HIGH (ref 2–14)
NEUTROPHILS # BLD AUTO: 1.97 K/UL — SIGNIFICANT CHANGE UP (ref 1.8–7.4)
NEUTROPHILS NFR BLD AUTO: 41.9 % — LOW (ref 43–77)
NRBC # BLD AUTO: 0 K/UL — SIGNIFICANT CHANGE UP (ref 0–0)
NRBC # BLD: 0 /100 WBCS — SIGNIFICANT CHANGE UP (ref 0–0)
NRBC # FLD: 0 K/UL — SIGNIFICANT CHANGE UP (ref 0–0)
NRBC BLD-RTO: 0 /100 WBCS — SIGNIFICANT CHANGE UP (ref 0–0)
PLATELET # BLD AUTO: 277 K/UL — SIGNIFICANT CHANGE UP (ref 150–400)
POTASSIUM SERPL-MCNC: 3.6 MMOL/L — SIGNIFICANT CHANGE UP (ref 3.5–5.3)
POTASSIUM SERPL-SCNC: 3.6 MMOL/L — SIGNIFICANT CHANGE UP (ref 3.5–5.3)
PROT SERPL-MCNC: 7.7 G/DL — SIGNIFICANT CHANGE UP (ref 6–8.3)
RBC # BLD: 4.3 M/UL — SIGNIFICANT CHANGE UP (ref 4.2–5.8)
RBC # FLD: 12.5 % — SIGNIFICANT CHANGE UP (ref 10.3–14.5)
SODIUM SERPL-SCNC: 136 MMOL/L — SIGNIFICANT CHANGE UP (ref 135–145)
WBC # BLD: 4.71 K/UL — SIGNIFICANT CHANGE UP (ref 3.8–10.5)
WBC # FLD AUTO: 4.71 K/UL — SIGNIFICANT CHANGE UP (ref 3.8–10.5)

## 2024-11-27 PROCEDURE — 72125 CT NECK SPINE W/O DYE: CPT | Mod: 26,MC

## 2024-11-27 PROCEDURE — 70450 CT HEAD/BRAIN W/O DYE: CPT | Mod: 26,MC

## 2024-11-27 PROCEDURE — 70486 CT MAXILLOFACIAL W/O DYE: CPT | Mod: 26,MC

## 2024-11-27 PROCEDURE — 99284 EMERGENCY DEPT VISIT MOD MDM: CPT

## 2024-11-27 RX ORDER — SODIUM CHLORIDE 9 G/1000ML
1000 INJECTION, SOLUTION INTRAVENOUS ONCE
Refills: 0 | Status: COMPLETED | OUTPATIENT
Start: 2024-11-27 | End: 2024-11-27

## 2024-11-27 RX ORDER — ACETAMINOPHEN 500 MG/5ML
975 LIQUID (ML) ORAL ONCE
Refills: 0 | Status: COMPLETED | OUTPATIENT
Start: 2024-11-27 | End: 2024-11-27

## 2024-11-27 RX ADMIN — Medication 975 MILLIGRAM(S): at 21:32

## 2024-11-27 RX ADMIN — SODIUM CHLORIDE 1000 MILLILITER(S): 9 INJECTION, SOLUTION INTRAVENOUS at 21:31

## 2024-12-02 LAB
GAMMA INTERFERON BACKGROUND BLD IA-ACNC: 0.1 IU/ML — SIGNIFICANT CHANGE UP
M TB IFN-G BLD-IMP: NEGATIVE — SIGNIFICANT CHANGE UP
M TB IFN-G CD4+ BCKGRND COR BLD-ACNC: 0.01 IU/ML — SIGNIFICANT CHANGE UP
M TB IFN-G CD4+CD8+ BCKGRND COR BLD-ACNC: 0 IU/ML — SIGNIFICANT CHANGE UP
QUANT TB PLUS MITOGEN MINUS NIL: >10 IU/ML — SIGNIFICANT CHANGE UP

## 2024-12-11 NOTE — PHARMACOTHERAPY INTERVENTION NOTE - COMMENTS
Pre-operative Clearance  Name: Oni Nagy      : 1949      MRN: [id number]  Encounter Provider: Gage Cedeno MD  Encounter Date: 2024   Encounter department: [facility] PRIMARY [facility]ARE    Assessment & Plan  Pre-op examination    Orders:    POCT ECG    History of urinary retention    Occurring after anethesia, monitor post op       Paroxysmal atrial fibrillation (HCC)     Remains on eliquis       Pre-operative Clearance:     Revised Cardiac Risk Index:  RCI RISK CLASS I (0 risk factors, risk of major cardiac complications approximately 0.5%)    Clearance:       Patient is at low/acceptable risk for upcoming, surgery recently seen by cardiology as well    Medication Instructions:   - Alpha-1 Adrenergic Blocker (ie, tamsulosin, doxazosin, alfusozin): Continue to take this medication on your normal schedule.  - Calcium channel blockers: Hold diltazem morning of surgery as reccomended by cardiology  - Direct Xa Inhibitor (ie, Eliquis, Xarelto): Stop eliquis 2 days prior to surgery as reccomended by cardiology  - Hyperlipidemia meds: Continue to take this medication on your normal schedule.       History of Present Illness     Pre-op Exam    Pre-operative Risk Factors:    History of cerebrovascular disease: No    History of ischemic heart disease: Yes    Pre-operative treatment with insulin: No  Pre-operative creatinine >2 mg/dL: No    History of congestive heart failure: No    Review of Systems   Constitutional:  Negative for activity change and appetite change.   Respiratory:  Negative for apnea and chest tightness.    Gastrointestinal:  Negative for abdominal distention and abdominal pain.   Musculoskeletal:  Negative for arthralgias.     Past Medical History   Past Medical History:   Diagnosis Date    Acute encephalopathy 10/17/2024    Acute metabolic encephalopathy 10/17/2024    Anesthesia complication     urinary retention for all of surgeries    Benign essential  hypertension 03/23/2011    BPH (benign prostatic hyperplasia) 02/20/2023    Last Assessment & Plan:  Formatting of this note might be different from the original. Doing well.  Cont tamsulosin and saw palmetto.  We did discuss adding finasteride but will have repeat psa prior.  Also discussed indications for holep.  As of now, he's doing great. Last episode of retention was clearly related to anesthesia.  Fu 4-6w with psa    Cardiogenic shock ([facility]) 07/05/2022    Chronic kidney disease, stage III (moderate) ([facility])     Coronary atherosclerosis 03/23/2011    Last Assessment & Plan:  There are no symptoms of an anginal syndrome. I provided education regarding the nature of coronary artery disease and our recommendations to reduce his long-term risk of ACS.  I recommended he continue utilizing aspirin and statins to minimize risk.  A lipid profile was requested with the option to use high intensity statins as an alternative to Mevacor in response to per    Diverticulitis of colon 2006    Enlarged prostate     Epistaxis 01/09/2022    Heart murmur 2019    History of aortic valve disease     History of mitral valve disease     History of transfusion     May 2021 - no adverse reaction    Hypertension     Paroxysmal atrial fibrillation ([facility])  02/06/2020    Pulmonary nodules 02/05/2021    PVC's (premature ventricular contractions)     S/P mitral valve repair 06/15/2021    May 2021 at [facility]    Scoliosis 2022    Status post aortic valve replacement 05/31/2021    At [facility] May 2021    SVT (supraventricular tachycardia) ([facility])     Syncope, cardiogenic     Thoracic aortic aneurysm without rupture ([facility]) 02/05/2021    4.5cm on CT 1/21  Formatting of this note might be different from the original. 4.5cm on CT chest 1/28/21  Last Assessment & Plan:  Formatting of this note might be different from the original. 6/6/21 chest CT showed stable 4.3 cm ascending thoracic aortic aneurysm.    Thrombocytopenia ([facility]) 07/05/2022     Patient and mom at bedside educated A1c, basal insulin pen administration, hypoglycemia and treatment, healthy plate, sources of carbohydrates, goal blood glucose, and when to check BG. Patient's sister (Jose Armando Reyes, his uncle, and ID#[id number] Cuong) all provided translation services at various times. It was very difficult to get accurate information from the mom and patient. He provided a medication list on his phone which said Insulin glargine 25-30 units qAM, glimepiride 2 mg daily, Rybelsus 14 mg daily, and metformin 1000 mG BID - however when I called the doctor's office ([phone]), they said they stopped glimepiride. When I asked the mom to clarify which medications he was taking he did not know but says he is not taking insulin. Patient and mom said they don't want insulin and have not been taking it, but his uncle said he was? Then patient's mom said he has been throwing up everyday due to diabetes - they could not tell me if the Rybelsus is new which can cause vomiting? Showed patient's mom how to use insulin but she would not return demonstrate. Patients mom says the BG is usually >200 mG/dL (did not say how often or what time of day). Patient himself actually performed good return demonstration on basal insulin pen (did not prime the pen) - but it is unclear if he will take at home or if his mom will encourage/help. Overall, more reinforcement is needed as there is health literacy issues.  Pt encouraged for outpt f/u with medicine and endocrine.  Urinary tract infection     Treated     Past Surgical History:   Procedure Laterality Date    CARDIAC ELECTROPHYSIOLOGY STUDY AND ABLATION  2024    CARDIAC SURGERY      MV repair, AV replaced and aortic aneurysm repaired    CARDIAC VALVE REPLACEMENT  2021    CAUTERIZE INNER NOSE Left 2022    Procedure: Sphenopalatine artery ligation;  Surgeon: Alfonso Rodriguez MD;  Location: [facility];  Service: ENT    COLONOSCOPY      Dr. Schreiber.  Tubular adenoma descending colon polyp.    COLONOSCOPY      Dr. Schreiber.  Diverticulosis.    COLONOSCOPY  2020    Dr. Schreiber.  12 mm descending colon inflammatory polyp, 3 mm benign lymphoid aggregate, diverticulosis.    FACIAL/NECK BIOPSY Left 2021    Procedure: EXCISION PYOGENIC GRANULOMA; FREE MUCOSAL GRAFT FROM NOSE;  Surgeon: Alfonso Rodriguez MD;  Location: [facility];  Service: ENT    INGUINAL HERNIA REPAIR      LUMBAR FUSION Bilateral 10/10/2024    Procedure: Navigated L2-5 laminectomy/decompression, L3-5 instrumented fusion, TLIF;  Surgeon: Jerod Foss MD;  Location: [facility];  Service: Orthopedics    TONSILLECTOMY       Family History   Problem Relation Age of Onset    Alzheimer's disease Mother 80    Esophageal cancer Mother     Dementia Mother             Cancer Mother             Diabetes Father     Diabetes Sister     Kidney failure Sister     Stroke Sister     Coronary artery disease Sister         MI    Breast cancer Sister     Diabetes Maternal Grandmother     Coronary artery disease Maternal Uncle         massive MI - age 49 and 50    Cancer Sister     Cancer Sister      Social History     Tobacco Use    Smoking status: Former     Types: Cigarettes     Passive exposure: Never    Smokeless tobacco: Former   Vaping Use    Vaping status: Former   Substance and Sexual Activity    Alcohol use: Not Currently    Drug use: No    Sexual activity: Not Currently     Partners: Female     Birth  control/protection: None     Current Outpatient Medications on File Prior to Visit   Medication Sig    atorvastatin (LIPITOR) 20 mg tablet Take 20 mg by mouth daily    Cholecalciferol (Vitamin D3) 50 MCG (2000 UT) capsule Take 2,000 Units by mouth daily    diltiazem (DILACOR XR) 120 MG 24 hr capsule Take 120 mg by mouth daily    Eliquis 5 MG Take 1 tablet (5 mg total) by mouth 2 (two) times a day    Magnesium 250 MG TABS Take 1 tablet by mouth daily      Saw Palmetto, Serenoa repens, (SAW PALMETTO PO) Take 1 capsule by mouth daily 250 mg    tamsulosin (FLOMAX) 0.4 mg TAKE 1 CAPSULE BY MOUTH EVERY DAY AT NIGHT    zinc gluconate 50 mg tablet Take 50 mg by mouth daily    acetaminophen (TYLENOL) 650 mg CR tablet Take 1 tablet (650 mg total) by mouth every 8 (eight) hours as needed for mild pain     Allergies   Allergen Reactions    Oxycodone Nausea Only     Objective   /72 (BP Location: Left arm, Patient Position: Sitting, Cuff Size: Standard)   Pulse 67   Temp 97.8 °F (36.6 °C) (Tympanic)   Resp 20   Ht 6' (1.829 m)   Wt 96.1 kg (211 lb 12.8 oz)   SpO2 97%   BMI 28.73 kg/m²     Physical Exam  Constitutional:       Appearance: Normal appearance.   Cardiovascular:      Rate and Rhythm: Normal rate and regular rhythm.      Pulses: Normal pulses.      Heart sounds: Normal heart sounds.   Pulmonary:      Effort: Pulmonary effort is normal.   Musculoskeletal:         General: Normal range of motion.   Neurological:      Mental Status: He is alert.           Gage Cedeno MD   Patient and mom at bedside educated A1c, basal insulin pen administration, hypoglycemia and treatment, healthy plate, sources of carbohydrates, goal blood glucose, and when to check BG. Patient's sister (Jose Armando Reyes, his uncle, and ID#[id number] Cuong) all provided translation services at various times. It was very difficult to get accurate information from the mom and patient. He provided a medication list on his phone which said Insulin glargine 25-30 units qAM, glimepiride 2 mg daily, Rybelsus 14 mg daily, and metformin 1000 mG BID - however when I called the doctor's office ([phone]), they said they stopped glimepiride. When I asked the mom to clarify which medications he was taking he did not know but says he is not taking insulin. Patient and mom said they don't want insulin and have not been taking it, but his uncle said he was? Then patient's mom said he has been throwing up everyday due to diabetes - they could not tell me if the Rybelsus is new which can cause vomiting? Showed patient's mom how to use insulin but she would not return demonstrate. Patients mom says the BG is usually >200 mG/dL (did not say how often or what time of day). Patient himself actually performed good return demonstration on basal insulin pen (did not prime the pen) - but it is unclear if he will take at home or if his mom will encourage/help. Overall, more reinforcement is needed as there is health literacy issues.  Pt encouraged for outpt f/u with medicine and endocrine.     4/9/24: Saw patient, mom, and family friend at bedside with endocrine NP. Patient is agreeable to administering basal insulin and states he gives insulin to his mom and sister at home. [place] plan from endocrine is basal/bolus due to prednisone however it does not seem doable for the patient at this time. Plan: Lantus 18 units qHS, metformin 1000 mG BID, and Rybelsus 14 mg every morning - counseled patient multiple times and at the end he was able to repeat back proper doses and return demonstrate the insulin pen (wrote down instructions too). Patient kept going back and forth on whether or not he was taking the "r" medicine (Rybelsus) - his pharmacy dispensed a 30 days supply on 3/10 and patient unable to say/locate bottle at home. Patient eventually states he does take it (unclear but endocrine is recommending continue this home medicine). Due to developmental disability and some confusion with medications simplifying the plan may be the safest and we counseled patient to call his PCP if BG >350 mG/dL or <70 mG/dL. He agrees to check BG throughout the day and to follow-up with MD.

## 2025-03-08 ENCOUNTER — EMERGENCY (EMERGENCY)
Facility: HOSPITAL | Age: 35
LOS: 1 days | Discharge: HOME | End: 2025-03-08
Attending: STUDENT IN AN ORGANIZED HEALTH CARE EDUCATION/TRAINING PROGRAM | Admitting: STUDENT IN AN ORGANIZED HEALTH CARE EDUCATION/TRAINING PROGRAM
Payer: MEDICAID

## 2025-03-08 VITALS
TEMPERATURE: 98 F | DIASTOLIC BLOOD PRESSURE: 81 MMHG | RESPIRATION RATE: 15 BRPM | SYSTOLIC BLOOD PRESSURE: 122 MMHG | OXYGEN SATURATION: 100 % | HEART RATE: 85 BPM

## 2025-03-08 VITALS
RESPIRATION RATE: 16 BRPM | TEMPERATURE: 99 F | HEART RATE: 106 BPM | OXYGEN SATURATION: 100 % | DIASTOLIC BLOOD PRESSURE: 85 MMHG | SYSTOLIC BLOOD PRESSURE: 129 MMHG

## 2025-03-08 LAB
A1C WITH ESTIMATED AVERAGE GLUCOSE RESULT: 10.1 % — HIGH (ref 4–5.6)
ALBUMIN SERPL ELPH-MCNC: 4.2 G/DL — SIGNIFICANT CHANGE UP (ref 3.3–5)
ALP SERPL-CCNC: 123 U/L — HIGH (ref 40–120)
ALT FLD-CCNC: 23 U/L — SIGNIFICANT CHANGE UP (ref 4–41)
ANION GAP SERPL CALC-SCNC: 12 MMOL/L — SIGNIFICANT CHANGE UP (ref 7–14)
APPEARANCE UR: CLEAR — SIGNIFICANT CHANGE UP
AST SERPL-CCNC: 26 U/L — SIGNIFICANT CHANGE UP (ref 4–40)
BASOPHILS # BLD AUTO: 0 K/UL — SIGNIFICANT CHANGE UP (ref 0–0.2)
BASOPHILS NFR BLD AUTO: 0 % — SIGNIFICANT CHANGE UP (ref 0–2)
BILIRUB SERPL-MCNC: 0.6 MG/DL — SIGNIFICANT CHANGE UP (ref 0.2–1.2)
BILIRUB UR-MCNC: NEGATIVE — SIGNIFICANT CHANGE UP
BLOOD GAS VENOUS COMPREHENSIVE RESULT: SIGNIFICANT CHANGE UP
BUN SERPL-MCNC: 13 MG/DL — SIGNIFICANT CHANGE UP (ref 7–23)
CALCIUM SERPL-MCNC: 9.4 MG/DL — SIGNIFICANT CHANGE UP (ref 8.4–10.5)
CHLORIDE SERPL-SCNC: 104 MMOL/L — SIGNIFICANT CHANGE UP (ref 98–107)
CO2 SERPL-SCNC: 23 MMOL/L — SIGNIFICANT CHANGE UP (ref 22–31)
COLOR SPEC: YELLOW — SIGNIFICANT CHANGE UP
CREAT SERPL-MCNC: 0.91 MG/DL — SIGNIFICANT CHANGE UP (ref 0.5–1.3)
DIFF PNL FLD: NEGATIVE — SIGNIFICANT CHANGE UP
EGFR: 114 ML/MIN/1.73M2 — SIGNIFICANT CHANGE UP
EOSINOPHIL # BLD AUTO: 0.17 K/UL — SIGNIFICANT CHANGE UP (ref 0–0.5)
EOSINOPHIL NFR BLD AUTO: 4.5 % — SIGNIFICANT CHANGE UP (ref 0–6)
ESTIMATED AVERAGE GLUCOSE: 243 — SIGNIFICANT CHANGE UP
FLUAV AG NPH QL: SIGNIFICANT CHANGE UP
FLUBV AG NPH QL: SIGNIFICANT CHANGE UP
GIANT PLATELETS BLD QL SMEAR: PRESENT — SIGNIFICANT CHANGE UP
GLUCOSE SERPL-MCNC: 292 MG/DL — HIGH (ref 70–99)
GLUCOSE UR QL: 500 MG/DL
HCT VFR BLD CALC: 39.9 % — SIGNIFICANT CHANGE UP (ref 39–50)
HGB BLD-MCNC: 13.6 G/DL — SIGNIFICANT CHANGE UP (ref 13–17)
HIV 1+2 AB+HIV1 P24 AG SERPL QL IA: SIGNIFICANT CHANGE UP
IANC: 1.55 K/UL — LOW (ref 1.8–7.4)
KETONES UR-MCNC: NEGATIVE MG/DL — SIGNIFICANT CHANGE UP
LEUKOCYTE ESTERASE UR-ACNC: NEGATIVE — SIGNIFICANT CHANGE UP
LYMPHOCYTES # BLD AUTO: 1.15 K/UL — SIGNIFICANT CHANGE UP (ref 1–3.3)
LYMPHOCYTES # BLD AUTO: 30.4 % — SIGNIFICANT CHANGE UP (ref 13–44)
MAGNESIUM SERPL-MCNC: 2 MG/DL — SIGNIFICANT CHANGE UP (ref 1.6–2.6)
MCHC RBC-ENTMCNC: 28.3 PG — SIGNIFICANT CHANGE UP (ref 27–34)
MCHC RBC-ENTMCNC: 34.1 G/DL — SIGNIFICANT CHANGE UP (ref 32–36)
MCV RBC AUTO: 83 FL — SIGNIFICANT CHANGE UP (ref 80–100)
METAMYELOCYTES # FLD: 0.9 % — SIGNIFICANT CHANGE UP (ref 0–1)
METAMYELOCYTES NFR BLD: 0.9 % — SIGNIFICANT CHANGE UP (ref 0–1)
MONOCYTES # BLD AUTO: 0.57 K/UL — SIGNIFICANT CHANGE UP (ref 0–0.9)
MONOCYTES NFR BLD AUTO: 15.2 % — HIGH (ref 2–14)
NEUTROPHILS # BLD AUTO: 1.58 K/UL — LOW (ref 1.8–7.4)
NEUTROPHILS NFR BLD AUTO: 34.8 % — LOW (ref 43–77)
NEUTS BAND # BLD: 7.1 % — HIGH (ref 0–6)
NEUTS BAND NFR BLD: 7.1 % — HIGH (ref 0–6)
NITRITE UR-MCNC: NEGATIVE — SIGNIFICANT CHANGE UP
PH UR: 6.5 — SIGNIFICANT CHANGE UP (ref 5–8)
PHOSPHATE SERPL-MCNC: 4.4 MG/DL — SIGNIFICANT CHANGE UP (ref 2.5–4.5)
PLAT MORPH BLD: NORMAL — SIGNIFICANT CHANGE UP
PLATELET # BLD AUTO: 232 K/UL — SIGNIFICANT CHANGE UP (ref 150–400)
PLATELET COUNT - ESTIMATE: NORMAL — SIGNIFICANT CHANGE UP
POTASSIUM SERPL-MCNC: 4.1 MMOL/L — SIGNIFICANT CHANGE UP (ref 3.5–5.3)
POTASSIUM SERPL-SCNC: 4.1 MMOL/L — SIGNIFICANT CHANGE UP (ref 3.5–5.3)
PROT SERPL-MCNC: 7.6 G/DL — SIGNIFICANT CHANGE UP (ref 6–8.3)
PROT UR-MCNC: NEGATIVE MG/DL — SIGNIFICANT CHANGE UP
RBC # BLD: 4.81 M/UL — SIGNIFICANT CHANGE UP (ref 4.2–5.8)
RBC # FLD: 12.3 % — SIGNIFICANT CHANGE UP (ref 10.3–14.5)
RBC BLD AUTO: NORMAL — SIGNIFICANT CHANGE UP
RSV RNA NPH QL NAA+NON-PROBE: SIGNIFICANT CHANGE UP
SARS-COV-2 RNA SPEC QL NAA+PROBE: SIGNIFICANT CHANGE UP
SODIUM SERPL-SCNC: 139 MMOL/L — SIGNIFICANT CHANGE UP (ref 135–145)
SP GR SPEC: 1.03 — HIGH (ref 1–1.03)
UROBILINOGEN FLD QL: 1 MG/DL — SIGNIFICANT CHANGE UP (ref 0.2–1)
VARIANT LYMPHS # BLD: 7.1 % — HIGH (ref 0–6)
VARIANT LYMPHS NFR BLD MANUAL: 7.1 % — HIGH (ref 0–6)
WBC # BLD: 3.77 K/UL — LOW (ref 3.8–10.5)
WBC # FLD AUTO: 3.77 K/UL — LOW (ref 3.8–10.5)

## 2025-03-08 PROCEDURE — 76705 ECHO EXAM OF ABDOMEN: CPT | Mod: 26

## 2025-03-08 PROCEDURE — 71046 X-RAY EXAM CHEST 2 VIEWS: CPT | Mod: 26

## 2025-03-08 PROCEDURE — 99284 EMERGENCY DEPT VISIT MOD MDM: CPT

## 2025-03-08 RX ORDER — SODIUM CHLORIDE 9 G/1000ML
1000 INJECTION, SOLUTION INTRAVENOUS ONCE
Refills: 0 | Status: COMPLETED | OUTPATIENT
Start: 2025-03-08 | End: 2025-03-08

## 2025-03-08 RX ADMIN — SODIUM CHLORIDE 1000 MILLILITER(S): 9 INJECTION, SOLUTION INTRAVENOUS at 16:54

## 2025-03-08 NOTE — ED ADULT NURSE NOTE - CHIEF COMPLAINT QUOTE
sent in by [facility] provider for weight loss and elevated glucose levels. NAD.c/o right side neck pain and RUQ pain. PT is unsure of when symptoms started.

## 2025-03-08 NOTE — ED PROVIDER NOTE - AVIAN FLU SYMPTOMS
Called patient, JENNIFER requesting call back. He has upcoming appointment 3/14/24.  
Pt would like to discuss a possible medication for his drinking. He states that he has previously talked a bit to Dr. March about drinking a 6 pack a night. He recently had blood work done and his numbers are all high. He would like help, he would like to stop drinking. Please advise [phone]      
Spoke to patient. He has an appointment on 3/14/24 to discuss concerns with his psychiatry care. Is looking for advice from PCP - perhaps switching therapists.    Patient is also looking for resources to help him stop drinking. He has been researching the different medications available. Writer advised this would need to be discuss in an appointment so he and Dr. March can decide on the best treatment for him. He verb understanding.     Dr. March, patient was previously prescribed Ativan 0.5 mg every 12 hours PRN. He wants to know if he could have this prescribed again until he is seen on 3/14. He feels his anxiety & difficulty sleeping is driving him to drink. Writer advised PCP out of office today, will not advise until Monday. Writer also advised it is unlikely PCP will prescribe Ativan without appointment. Patient will be seen in [facility] if anxiety & behaviors worsen over the weekend.   
No

## 2025-03-08 NOTE — ED ADULT TRIAGE NOTE - CHIEF COMPLAINT QUOTE
sent in by [facility] provider for weight loss and elevated glucose levels. NAD.c/o right side neck pain and RUQ pain. PT is unsure of when symptoms started.

## 2025-03-08 NOTE — ED PROVIDER NOTE - ATTENDING CONTRIBUTION TO CARE
Shimon Xiao DO:  patient seen and evaluated with the resident.  I was present for key portions of the History & Physical, and I agree with the Impression & Plan. 33 yo m pmh dm, Shimon Xiao DO:  patient seen and evaluated with the resident.  I was present for key portions of the History & Physical, and I agree with the Impression & Plan. 83 yo f pmh hypothyroidism, lung ca s/p treatment, PE (13 yrs ago) no longer on AC, COPD on home O2 (4L by NC), HTN, pw SOB.  PW family bedside provides collateral.  Reports SOB, LOWERY, cough, congestion.  Feels unwell.  Denies recent travel.  Reports may have had sick contact, her home health aide.  Patient arrives tachycardic, tachypneic.  Suspect pneumonia versus bronchitis.  Will treat for COPD exacerbation.  Labs, imaging, reassess. Shimon Xiao DO:  patient seen and evaluated with the resident.  I was present for key portions of the History & Physical, and I agree with the Impression & Plan. 33 yo m pmh dm, Shimon Xiao DO:  patient seen and evaluated with the resident.  I was present for key portions of the History & Physical, and I agree with the Impression & Plan. 83 yo f pmh hypothyroidism, lung ca s/p treatment, PE (13 yrs ago) no longer on AC, COPD on home O2 (4L by NC), HTN, pw SOB.  PW family bedside provides collateral.  Reports SOB, LOWERY, cough, congestion.  Feels unwell.  Denies recent travel.  Reports may have had sick contact, her home health aide.  Patient arrives tachycardic, tachypneic.  Suspect pneumonia versus bronchitis.  Will treat for COPD exacerbation.  Labs, imaging, reassess..

## 2025-03-08 NOTE — ED PROVIDER NOTE - CLINICAL SUMMARY MEDICAL DECISION MAKING FREE TEXT BOX
HPI:  34 M w/ Hx of developmental delay, DM2 on metformin who presents from urgent care for evaluation of hyperglycemia. Patient unable to reliably provide accurate history but states he experienced abdominal pain, nausea and vomiting every time he takes his metformin. Otherwise, ROS limited. Denies any chest pain, shortness of breath, abdominal pain.    ROS:  As stated above.    FHx/SHx:   Non-contributory.    PE:   Vital signs reviewed.  GENERAL: No acute distress, non toxic-appearing  HEAD: Atraumatic, normocephalic  EARS: Externally normal, atraumatic  EYES: No jaundice, not injected, no rupture, no foreign bodies  MOUTH: Moist mucous membranes  NECK: No swelling, no lymphadenopathy  HEART: Regular rate and rhythm, normal S1/S2, no murmurs, no rubs, no gallops  LUNGS: Clear to auscultation bilaterally without rhonchi, rales, or wheezing  ABDOMEN: Soft, non-distended, and non tender in all 4 quadrants  EXTREMITIES: No gross deformities  VASCULAR: Pulses palpable in all extremities, no pitting edema, capillary refill <2 secs  SKIN: Grossly intact without rash or open wounds  PSYCH: Alert and oriented x 3  NEURO: Strength 5/5 and sensation intact in all 4 extremities.    A/P:   34 M w/ Hx as above who presents for evaluation of hyperglycemia. FS in triage 413. Mildly tachycardic to 106. Exam unremarkable. Concern for DKA in setting of possible infection.  - labs  - CXR, viral swap, UA  - reassess

## 2025-03-08 NOTE — ED ADULT NURSE NOTE - CCCP TRG CHIEF CMPLNT
Patient slept on and off throughout the night. New IV placed in the left wrist with fluids running. No new complaints. Patient used the bedpan with assistance. Currently resting in bed, will continue to monitor. hyperglycemia

## 2025-03-08 NOTE — ED PROVIDER NOTE - NSICDXPASTSURGICALHX_GEN_ALL_CORE_FT
The patient presents for follow-up to urinary symptoms.  Approximately 10 days ago she developed foul-smelling urine with hematuria.  She went into urgent care the next day.  She had some urgency at the time.  Prior to coming and she had taken a dose of Septra.  They did a UA and UC as noted with the use of a showing mixed asael.  Since then they have extended her Septra and she finished it a bit over 24 hours ago.    At this time she is not having bleeding urgency or frequency.  The smell has resolved.  No vaginal symptoms.  No stomach pain.  No fevers or night sweats.    She does have a history of gross hematuria evaluated by urology as noted in 2016.  She still has the sense that she might have a UTI but it is only a very vague feeling    Past Medical History:   Diagnosis Date     Colonic polyp 2012    adenomatous, fu 5 years; fu 9/18 one hyper polyp     Gross hematuria 2016    ct and cysto via Dr. Watt     Intermittent asthma      Palpitations 12/2018    echo nl     SVT (supraventricular tachycardia) (H) 1994    successful catheter ablation done u of m     Past Surgical History:   Procedure Laterality Date     CARDIAC SURGERY      ablation for SVT/WPW     COLONOSCOPY N/A 9/20/2018    Procedure: COMBINED COLONOSCOPY, SINGLE OR MULTIPLE BIOPSY/POLYPECTOMY BY BIOPSY;  colonoscopy;  Surgeon: Tre Carreon MD;  Location:  GI     ENT SURGERY      wisdom teeth, mandibular mass excision     wpw ablation surgery  43     Social History     Socioeconomic History     Marital status:      Spouse name: Not on file     Number of children: 2     Years of education: Not on file     Highest education level: Not on file   Occupational History     Occupation: physical therapist, works admin     Employer: [employer]   Social Needs     Financial resource strain: Not on file     Food insecurity:     Worry: Not on file     Inability: Not on file     Transportation needs:     Medical: Not on file      "Non-medical: Not on file   Tobacco Use     Smoking status: Never Smoker     Smokeless tobacco: Never Used   Substance and Sexual Activity     Alcohol use: Yes     Alcohol/week: 7.0 standard drinks     Types: 7 Standard drinks or equivalent per week     Comment: occ     Drug use: No     Sexual activity: Yes     Partners: Male   Lifestyle     Physical activity:     Days per week: Not on file     Minutes per session: Not on file     Stress: Not on file   Relationships     Social connections:     Talks on phone: Not on file     Gets together: Not on file     Attends [employer] service: Not on file     Active member of club or organization: Not on file     Attends meetings of clubs or organizations: Not on file     Relationship status: Not on file     Intimate partner violence:     Fear of current or ex partner: Not on file     Emotionally abused: Not on file     Physically abused: Not on file     Forced sexual activity: Not on file   Other Topics Concern     Parent/sibling w/ CABG, MI or angioplasty before 65F 55M? Not Asked   Social History Narrative     Not on file     Current Outpatient Medications   Medication Sig Dispense Refill     EPINEPHrine (EPIPEN/ADRENACLICK/OR ANY BX GENERIC EQUIV) 0.3 MG/0.3ML injection 2-pack Inject 0.3 mLs (0.3 mg) into the muscle as needed for anaphylaxis 0.6 mL 1     Allergies   Allergen Reactions     Iodine-131      Shellfish Allergy Hives     FAMILY HISTORY NOTED AND REVIEWED    REVIEW OF SYSTEMS: above    PHYSICAL EXAM    BP (!) 162/95 (BP Location: Left arm, Patient Position: Sitting, Cuff Size: Adult Regular)   Pulse 94   Temp 96.9  F (36.1  C) (Oral)   Ht 1.727 m (5' 8\")   Wt 68.5 kg (151 lb)   SpO2 100%   Breastfeeding? No   BMI 22.96 kg/m      Patient appears non toxic  Normal active bowel sounds, soft non-tender, no mgr, no hepatosplenomegaly    ua noted    ASSESSMENT:  Gross hematuria which I believe is likely due to a UTI.  The urine culture showed only mixed asael but " she had taken an antibiotic prior to this.  I doubt a malignant cause and she did have a prior work-up.  I did ask her to speak to urology to make sure they do not need to do anything again and she will do this.  At this point we will observe but if she has any further symptoms she will let me know.    Jonn Downing M.D.         PAST SURGICAL HISTORY:  No significant past surgical history

## 2025-03-08 NOTE — ED PROVIDER NOTE - PRO INTERPRETER NEED 2
English
  Cleans sacral wound with wound cleanser, pat dry then apply triad with moist saline dressing             Apply Allevyn to R heel DTI           Pressure ulcer preventive  measures          Skin care   .

## 2025-03-08 NOTE — ED PROVIDER NOTE - PATIENT PORTAL LINK FT
You can access the FollowMyHealth Patient Portal offered by [facility] by registering at the following website: [email or web address]. By joining [facility]’s FollowMyHealth portal, you will also be able to view your health information using other applications (apps) compatible with our system.

## 2025-03-08 NOTE — ED PROVIDER NOTE - NSFOLLOWUPINSTRUCTIONS_ED_ALL_ED_FT
You were seen in the emergency department for high blood sugar. We have evaluated you and determined that you do not require further hospital interventions.    During your stay you had the following relevant results: Your blood tests did not show signs of a dangerous condition called DKA due to your high sugars. However you will need to follow up with your endocrine doctor to manage your sugars.    Please follow up with your primary care provider as necessary to discuss the results of your stay in our department.    If you start to experience worsening symptoms such as fevers, chills, abdominal pain, nausea, vomiting, chest pain, shortness of breath or any other concerns, please return to the emergency department for further evaluation.

## 2025-03-08 NOTE — ED ADULT NURSE NOTE - OBJECTIVE STATEMENT
Patient c/o high blood sugar, IV placed, labs sent , medicated as ordered. Patient appears comfortable in no distress.

## 2025-03-10 LAB
HAV IGM SER-ACNC: SIGNIFICANT CHANGE UP
HBV CORE IGM SER-ACNC: SIGNIFICANT CHANGE UP
HBV SURFACE AG SER-ACNC: SIGNIFICANT CHANGE UP
HCV AB S/CO SERPL IA: 0.17 S/CO — SIGNIFICANT CHANGE UP (ref 0–0.79)
HCV AB SERPL-IMP: SIGNIFICANT CHANGE UP

## 2025-06-12 ENCOUNTER — EMERGENCY (EMERGENCY)
Facility: HOSPITAL | Age: 35
LOS: 0 days | Discharge: HOME | End: 2025-06-12
Attending: EMERGENCY MEDICINE
Payer: MEDICAID

## 2025-06-12 VITALS
HEIGHT: 60 IN | RESPIRATION RATE: 19 BRPM | HEART RATE: 124 BPM | SYSTOLIC BLOOD PRESSURE: 128 MMHG | TEMPERATURE: 99 F | DIASTOLIC BLOOD PRESSURE: 87 MMHG | WEIGHT: 110.01 LBS | OXYGEN SATURATION: 100 %

## 2025-06-12 VITALS
RESPIRATION RATE: 19 BRPM | HEART RATE: 87 BPM | SYSTOLIC BLOOD PRESSURE: 118 MMHG | TEMPERATURE: 100 F | OXYGEN SATURATION: 100 % | DIASTOLIC BLOOD PRESSURE: 79 MMHG

## 2025-06-12 DIAGNOSIS — R51.9 HEADACHE, UNSPECIFIED: ICD-10-CM

## 2025-06-12 DIAGNOSIS — Z79.4 LONG TERM (CURRENT) USE OF INSULIN: ICD-10-CM

## 2025-06-12 DIAGNOSIS — B34.9 VIRAL INFECTION, UNSPECIFIED: ICD-10-CM

## 2025-06-12 DIAGNOSIS — E11.9 TYPE 2 DIABETES MELLITUS WITHOUT COMPLICATIONS: ICD-10-CM

## 2025-06-12 DIAGNOSIS — K21.9 GASTRO-ESOPHAGEAL REFLUX DISEASE WITHOUT ESOPHAGITIS: ICD-10-CM

## 2025-06-12 DIAGNOSIS — E03.9 HYPOTHYROIDISM, UNSPECIFIED: ICD-10-CM

## 2025-06-12 DIAGNOSIS — F31.9 BIPOLAR DISORDER, UNSPECIFIED: ICD-10-CM

## 2025-06-12 DIAGNOSIS — Z86.59 PERSONAL HISTORY OF OTHER MENTAL AND BEHAVIORAL DISORDERS: ICD-10-CM

## 2025-06-12 LAB
ALBUMIN SERPL ELPH-MCNC: 3.8 G/DL — SIGNIFICANT CHANGE UP (ref 3.3–5)
ALP SERPL-CCNC: 161 U/L — HIGH (ref 40–120)
ALT FLD-CCNC: 50 U/L — SIGNIFICANT CHANGE UP (ref 12–78)
ANION GAP SERPL CALC-SCNC: 8 MMOL/L — SIGNIFICANT CHANGE UP (ref 5–17)
APPEARANCE UR: CLEAR — SIGNIFICANT CHANGE UP
AST SERPL-CCNC: 35 U/L — SIGNIFICANT CHANGE UP (ref 15–37)
BASE EXCESS BLDV CALC-SCNC: 5.1 MMOL/L — HIGH (ref -2–3)
BASOPHILS # BLD AUTO: 0.01 K/UL — SIGNIFICANT CHANGE UP (ref 0–0.2)
BASOPHILS NFR BLD AUTO: 0.2 % — SIGNIFICANT CHANGE UP (ref 0–2)
BILIRUB SERPL-MCNC: 0.9 MG/DL — SIGNIFICANT CHANGE UP (ref 0.2–1.2)
BILIRUB UR-MCNC: NEGATIVE — SIGNIFICANT CHANGE UP
BLOOD GAS COMMENTS, VENOUS: SIGNIFICANT CHANGE UP
BUN SERPL-MCNC: 5 MG/DL — LOW (ref 7–23)
CALCIUM SERPL-MCNC: 9.3 MG/DL — SIGNIFICANT CHANGE UP (ref 8.5–10.1)
CHLORIDE BLDV-SCNC: 101 MMOL/L — SIGNIFICANT CHANGE UP (ref 98–107)
CHLORIDE SERPL-SCNC: 103 MMOL/L — SIGNIFICANT CHANGE UP (ref 96–108)
CO2 BLDV-SCNC: 32 MMOL/L — HIGH (ref 22–26)
CO2 SERPL-SCNC: 24 MMOL/L — SIGNIFICANT CHANGE UP (ref 22–31)
COLOR SPEC: YELLOW — SIGNIFICANT CHANGE UP
CREAT SERPL-MCNC: 1.02 MG/DL — SIGNIFICANT CHANGE UP (ref 0.5–1.3)
DIFF PNL FLD: NEGATIVE — SIGNIFICANT CHANGE UP
EGFR: 99 ML/MIN/1.73M2 — SIGNIFICANT CHANGE UP
EGFR: 99 ML/MIN/1.73M2 — SIGNIFICANT CHANGE UP
EOSINOPHIL # BLD AUTO: 0.04 K/UL — SIGNIFICANT CHANGE UP (ref 0–0.5)
EOSINOPHIL NFR BLD AUTO: 0.9 % — SIGNIFICANT CHANGE UP (ref 0–6)
FLUAV AG NPH QL: SIGNIFICANT CHANGE UP
FLUBV AG NPH QL: SIGNIFICANT CHANGE UP
GAS PNL BLDV: 133 MMOL/L — LOW (ref 136–145)
GAS PNL BLDV: SIGNIFICANT CHANGE UP
GAS PNL BLDV: SIGNIFICANT CHANGE UP
GLUCOSE BLDV-MCNC: 234 MG/DL — HIGH (ref 65–95)
GLUCOSE SERPL-MCNC: 223 MG/DL — HIGH (ref 70–99)
GLUCOSE UR QL: 250 MG/DL
HCO3 BLDV-SCNC: 30 MMOL/L — HIGH (ref 22–28)
HCT VFR BLD CALC: 37.7 % — LOW (ref 39–50)
HCT VFR BLDA CALC: 39 % — SIGNIFICANT CHANGE UP (ref 37–47)
HGB BLD CALC-MCNC: 12.9 G/DL — SIGNIFICANT CHANGE UP (ref 12.6–17.4)
HGB BLD-MCNC: 12.9 G/DL — LOW (ref 13–17)
HOROWITZ INDEX BLDV+IHG-RTO: 21 — SIGNIFICANT CHANGE UP
IMM GRANULOCYTES NFR BLD AUTO: 0.2 % — SIGNIFICANT CHANGE UP (ref 0–0.9)
KETONES UR QL: NEGATIVE MG/DL — SIGNIFICANT CHANGE UP
LACTATE BLDV-MCNC: 2.3 MMOL/L — HIGH (ref 0.56–1.39)
LEUKOCYTE ESTERASE UR-ACNC: NEGATIVE — SIGNIFICANT CHANGE UP
LYMPHOCYTES # BLD AUTO: 1.04 K/UL — SIGNIFICANT CHANGE UP (ref 1–3.3)
LYMPHOCYTES # BLD AUTO: 24.4 % — SIGNIFICANT CHANGE UP (ref 13–44)
MCHC RBC-ENTMCNC: 29.3 PG — SIGNIFICANT CHANGE UP (ref 27–34)
MCHC RBC-ENTMCNC: 34.2 G/DL — SIGNIFICANT CHANGE UP (ref 32–36)
MCV RBC AUTO: 85.5 FL — SIGNIFICANT CHANGE UP (ref 80–100)
MONOCYTES # BLD AUTO: 0.63 K/UL — SIGNIFICANT CHANGE UP (ref 0–0.9)
MONOCYTES NFR BLD AUTO: 14.8 % — HIGH (ref 2–14)
NEUTROPHILS # BLD AUTO: 2.53 K/UL — SIGNIFICANT CHANGE UP (ref 1.8–7.4)
NEUTROPHILS NFR BLD AUTO: 59.5 % — SIGNIFICANT CHANGE UP (ref 43–77)
NITRITE UR-MCNC: NEGATIVE — SIGNIFICANT CHANGE UP
NRBC BLD AUTO-RTO: 0 /100 WBCS — SIGNIFICANT CHANGE UP (ref 0–0)
PCO2 BLDV: 47 MMHG — SIGNIFICANT CHANGE UP (ref 42–55)
PH BLDV: 7.42 — SIGNIFICANT CHANGE UP (ref 7.32–7.43)
PH UR: 6 — SIGNIFICANT CHANGE UP (ref 5–8)
PLATELET # BLD AUTO: 222 K/UL — SIGNIFICANT CHANGE UP (ref 150–400)
PO2 BLDV: 40 MMHG — SIGNIFICANT CHANGE UP (ref 25–45)
POTASSIUM BLDV-SCNC: 4.6 MMOL/L — SIGNIFICANT CHANGE UP (ref 3.5–5.1)
POTASSIUM SERPL-MCNC: 4.2 MMOL/L — SIGNIFICANT CHANGE UP (ref 3.5–5.3)
POTASSIUM SERPL-SCNC: 4.2 MMOL/L — SIGNIFICANT CHANGE UP (ref 3.5–5.3)
PROT SERPL-MCNC: 8.2 GM/DL — SIGNIFICANT CHANGE UP (ref 6–8.3)
PROT UR-MCNC: SIGNIFICANT CHANGE UP MG/DL
RBC # BLD: 4.41 M/UL — SIGNIFICANT CHANGE UP (ref 4.2–5.8)
RBC # FLD: 13 % — SIGNIFICANT CHANGE UP (ref 10.3–14.5)
RSV RNA NPH QL NAA+NON-PROBE: SIGNIFICANT CHANGE UP
SAO2 % BLDV: 65.3 % — LOW (ref 94–98)
SARS-COV-2 RNA SPEC QL NAA+PROBE: SIGNIFICANT CHANGE UP
SODIUM SERPL-SCNC: 135 MMOL/L — SIGNIFICANT CHANGE UP (ref 135–145)
SOURCE RESPIRATORY: SIGNIFICANT CHANGE UP
SP GR SPEC: <1.005 — LOW (ref 1–1.03)
UROBILINOGEN FLD QL: 0.2 MG/DL — SIGNIFICANT CHANGE UP (ref 0.2–1)
WBC # BLD: 4.26 K/UL — SIGNIFICANT CHANGE UP (ref 3.8–10.5)
WBC # FLD AUTO: 4.26 K/UL — SIGNIFICANT CHANGE UP (ref 3.8–10.5)

## 2025-06-12 PROCEDURE — 99284 EMERGENCY DEPT VISIT MOD MDM: CPT

## 2025-06-12 RX ORDER — ACETAMINOPHEN 500 MG/5ML
1000 LIQUID (ML) ORAL ONCE
Refills: 0 | Status: COMPLETED | OUTPATIENT
Start: 2025-06-12 | End: 2025-06-12

## 2025-06-12 RX ADMIN — Medication 1000 MILLILITER(S): at 19:41

## 2025-06-12 RX ADMIN — Medication 400 MILLIGRAM(S): at 19:41

## 2025-06-12 NOTE — ED PROVIDER NOTE - CLINICAL SUMMARY MEDICAL DECISION MAKING FREE TEXT BOX
35-year-old male past medical history insulin-dependent type 2 DM bipolar disorder developmental delay hypothyroidism on GERD presents with complaint of general body aches and headache.  Patient states intermittent headache since yesterday in the areas of his sinuses.  Endorses body aches.  Denies known fever or chills chest pain shortness of breath abdominal pain nausea vomiting or diarrhea.  Physical exam WNL, Neuro intact. VS non actionable.   Will obtain CBC CMP VBG/Acetone r/o DKA, electrolyte abnormalities. Will give 1L bolus and ofirmev for pain, reassess.

## 2025-06-12 NOTE — ED PROVIDER NOTE - NSFOLLOWUPINSTRUCTIONS_ED_ALL_ED_FT
A viral syndrome is a collection of symptoms caused by a viral infection. These syndromes often lack specific diagnostic features and are categorized based on the observed symptoms. Many different viruses can cause similar syndromes, making it difficult to pinpoint the exact virus without specific testing (which often isn't necessary). Viral syndromes are typically self-limiting, meaning they resolve on their own with time.    Common Viral Syndromes:    Respiratory Syndromes (like the common cold or flu): These are the most prevalent viral syndromes and often involve the upper respiratory tract (nose, throat, and sinuses) and sometimes the lower respiratory tract (lungs). Symptoms can include cough, sore throat, runny nose, nasal congestion, sneezing, headache, body aches, and fever. Influenza (the flu) can cause more severe symptoms, including high fever, chills, and fatigue.  Gastrointestinal Syndromes (like viral gastroenteritis or "stomach flu"): These infections affect the digestive system and can cause symptoms such as nausea, vomiting, diarrhea, abdominal cramps, and sometimes fever. These can be caused by various viruses, including norovirus and rotavirus.  Exanthematous Syndromes (viral rashes): Many viral infections can cause skin rashes. These rashes can vary in appearance, from small, flat spots to raised bumps or blisters. Examples include chickenpox, measles, rubella, and roseola.  Mononucleosis (Mono): Caused by the Fabricio-Barr virus, mono is characterized by extreme fatigue, fever, sore throat, swollen lymph nodes, and sometimes an enlarged spleen. It is most common in adolescents and young adults.  Meningitis (viral): Although less common than bacterial meningitis, viral meningitis can cause inflammation of the membranes surrounding the brain and spinal cord. Symptoms can include headache, fever, stiff neck, and sensitivity to light.  Encephalitis (viral): Viral encephalitis is inflammation of the brain itself and can cause a range of symptoms, from mild flu-like symptoms to severe neurological problems, including seizures, confusion, and paralysis.  Causes of Viral Syndromes:    Viral syndromes are caused by a wide range of viruses. These viruses are typically spread through respiratory droplets (coughing or sneezing), contact with contaminated surfaces, or fecal-oral transmission.    Symptoms of Viral Syndromes:    Symptoms vary widely depending on the specific virus and the affected body system. See the list of common syndromes above for specific examples.    Diagnosis of Viral Syndromes:    Diagnosis is usually based on clinical symptoms. Specific viral testing is not always necessary or readily available, as treatment often focuses on managing symptoms rather than targeting a specific virus.    Treatment of Viral Syndromes:    Most viral syndromes are self-limiting and resolve with rest and supportive care. Treatment focuses on relieving symptoms. This can include:    Rest: Getting plenty of rest is essential for recovery.  Fluids: Drinking plenty of fluids helps prevent dehydration, especially with gastrointestinal syndromes.  Over-the-counter medications: Pain relievers and fever reducers can help manage symptoms like headache, body aches, and fever. Decongestants can help relieve nasal congestion. Important Note: Aspirin should not be given to children or teenagers due to the risk of Reye's syndrome.  Antiviral medications: In some cases, antiviral medications may be prescribed for specific viral infections, such as influenza or herpes simplex virus.  When to See a Doctor:    While most viral syndromes resolve on their own, it's important to see a doctor if you experience:    High fever (especially in infants and young children)  Severe headache  Stiff neck  Difficulty breathing  Severe dehydration  Symptoms that worsen or don't improve after several days  Prevention of Viral Syndromes:    Frequent handwashing, avoiding close contact with sick individuals, and staying up-to-date on vaccinations can help prevent the spread of viral infections.

## 2025-06-12 NOTE — ED PROVIDER NOTE - OBJECTIVE STATEMENT
35-year-old male past medical history insulin-dependent type 2 DM bipolar disorder developmental delay hypothyroidism on GERD presents with complaint of general body aches and headache.  Patient states intermittent headache since yesterday in the areas of his sinuses.  Endorses body aches.  Denies known fever or chills chest pain shortness of breath abdominal pain nausea vomiting or diarrhea. Denies WHOL. Denies neck pain or stiffness.     Language Line [place] [phone] [place]

## 2025-06-12 NOTE — ED PROVIDER NOTE - PATIENT PORTAL LINK FT
You can access the FollowMyHealth Patient Portal offered by [facility] by registering at the following website: [email or web address]. By joining [employer]’s FollowMyHealth portal, you will also be able to view your health information using other applications (apps) compatible with our system.

## 2025-06-12 NOTE — ED PROVIDER NOTE - PHYSICAL EXAMINATION
Vital signs reviewed.   CONSTITUTIONAL: Well-appearing; well-nourished; in no apparent distress. Non-toxic appearing.   HEAD: Normocephalic, atraumatic.  EYES: PERRL, EOM intact, conjunctiva and sclera WNL.  NECK/LYMPH: Supple; non-tender  CARD: RRR, Normal S1, S2; no murmurs, rubs, or gallops noted.  RESP: Normal chest excursion with respiration; breath sounds clear and equal bilaterally; no wheezes, rhonchi, or rales.  ABD/GI: soft, non-distended; non-tender  EXT/MS: moves all extremities  SKIN: Normal for age and race; warm; dry; good turgor  NEURO: Awake, alert, oriented x 3, no gross deficits, Negative Kernigs and Brudzinski signs.   PSYCH: Normal mood; appropriate affect.

## 2025-06-12 NOTE — ED ADULT TRIAGE NOTE - RESPIRATORY RATE (BREATHS/MIN)
Excuse Slip    Jamal Funes,           This is to certify that the above-named patient had an appointment at this office for professional attention on January 31, 2022.    Please excuse her:  (X)    FROM:   (X)    DUE TO:        Work  XX    Injury   XX    School      Illness       Gym      Other       Other        Comments: Off school 1/3/2022      Thank you,        Angela Lizarraga MD    
19

## 2025-06-12 NOTE — ED PROVIDER NOTE - ATTENDING CONTRIBUTION TO CARE
Patient evaluated and seen with MARKO Light agree with above history and physical - pt examined and seen by me personally - findings as seen:Patient with nonspecific body ache and weakness complaint otherwise with evaluation noted to have no flu, COVID and otherwise well-appearing.  Patient noted to have no major findings here in ED will recommend outpatient PMD follow-up as needed.

## 2025-06-12 NOTE — ED PROVIDER NOTE - PROGRESS NOTE DETAILS
Kuldeep NP: Patient states he feels much better, ambulating around the ED without assistance, smiling. Tolerating PO. Appears well for discharge.

## 2025-06-12 NOTE — ED PROVIDER NOTE - NS ED ATTENDING STATEMENT MOD
I have seen and examined this patient and fully participated in the care of this patient as the teaching attending.  The service was shared with the CLAUDIA.  I reviewed and verified the documentation.

## 2025-06-12 NOTE — ED ADULT NURSE NOTE - OBJECTIVE STATEMENT
35 year old male with PMH of DM and intellectual disability. Pt  biba from home with c/o  general malaise, nausea, body aches, and hyperglycemia. Pt with c/o of intermittent headaches, denies dizziness chnages in vision and chest pain. No focal deficit noted.

## 2025-06-13 LAB — B-OH-BUTYR SERPL-SCNC: 0.1 MMOL/L — SIGNIFICANT CHANGE UP

## 2025-07-24 NOTE — ED ADULT NURSE NOTE - NSIMPLEMENTINTERV_GEN_ALL_ED
Implemented All Universal Safety Interventions:  [place] to call system. Call bell, personal items and telephone within reach. Instruct patient to call for assistance. Room bathroom lighting operational. Non-slip footwear when patient is off stretcher. Physically safe environment: no spills, clutter or unnecessary equipment. Stretcher in lowest position, wheels locked, appropriate side rails in place.
DC instructions

## 2025-07-27 ENCOUNTER — EMERGENCY (EMERGENCY)
Facility: HOSPITAL | Age: 35
LOS: 0 days | Discharge: HOME | End: 2025-07-28
Attending: EMERGENCY MEDICINE
Payer: MEDICAID

## 2025-07-27 VITALS
RESPIRATION RATE: 18 BRPM | WEIGHT: 130.07 LBS | OXYGEN SATURATION: 97 % | DIASTOLIC BLOOD PRESSURE: 80 MMHG | HEART RATE: 148 BPM | SYSTOLIC BLOOD PRESSURE: 111 MMHG | HEIGHT: 60 IN | TEMPERATURE: 101 F

## 2025-07-27 DIAGNOSIS — K21.9 GASTRO-ESOPHAGEAL REFLUX DISEASE WITHOUT ESOPHAGITIS: ICD-10-CM

## 2025-07-27 DIAGNOSIS — E11.9 TYPE 2 DIABETES MELLITUS WITHOUT COMPLICATIONS: ICD-10-CM

## 2025-07-27 DIAGNOSIS — Z79.4 LONG TERM (CURRENT) USE OF INSULIN: ICD-10-CM

## 2025-07-27 DIAGNOSIS — R11.2 NAUSEA WITH VOMITING, UNSPECIFIED: ICD-10-CM

## 2025-07-27 DIAGNOSIS — K92.0 HEMATEMESIS: ICD-10-CM

## 2025-07-27 DIAGNOSIS — R10.811 RIGHT UPPER QUADRANT ABDOMINAL TENDERNESS: ICD-10-CM

## 2025-07-27 DIAGNOSIS — E03.9 HYPOTHYROIDISM, UNSPECIFIED: ICD-10-CM

## 2025-07-27 LAB
ALBUMIN SERPL ELPH-MCNC: 3.6 G/DL — SIGNIFICANT CHANGE UP (ref 3.3–5)
ALP SERPL-CCNC: 201 U/L — HIGH (ref 40–120)
ALT FLD-CCNC: 39 U/L — SIGNIFICANT CHANGE UP (ref 12–78)
ANION GAP SERPL CALC-SCNC: 8 MMOL/L — SIGNIFICANT CHANGE UP (ref 5–17)
AST SERPL-CCNC: 32 U/L — SIGNIFICANT CHANGE UP (ref 15–37)
BILIRUB SERPL-MCNC: 1 MG/DL — SIGNIFICANT CHANGE UP (ref 0.2–1.2)
BUN SERPL-MCNC: 12 MG/DL — SIGNIFICANT CHANGE UP (ref 7–23)
CALCIUM SERPL-MCNC: 8.9 MG/DL — SIGNIFICANT CHANGE UP (ref 8.5–10.1)
CHLORIDE SERPL-SCNC: 100 MMOL/L — SIGNIFICANT CHANGE UP (ref 96–108)
CO2 SERPL-SCNC: 27 MMOL/L — SIGNIFICANT CHANGE UP (ref 22–31)
CREAT SERPL-MCNC: 1.27 MG/DL — SIGNIFICANT CHANGE UP (ref 0.5–1.3)
EGFR: 76 ML/MIN/1.73M2 — SIGNIFICANT CHANGE UP
EGFR: 76 ML/MIN/1.73M2 — SIGNIFICANT CHANGE UP
GLUCOSE SERPL-MCNC: 175 MG/DL — HIGH (ref 70–99)
HCT VFR BLD CALC: 38.5 % — LOW (ref 39–50)
HGB BLD-MCNC: 13.4 G/DL — SIGNIFICANT CHANGE UP (ref 13–17)
LIDOCAIN IGE QN: 37 U/L — SIGNIFICANT CHANGE UP (ref 13–75)
MCHC RBC-ENTMCNC: 29.3 PG — SIGNIFICANT CHANGE UP (ref 27–34)
MCHC RBC-ENTMCNC: 34.8 G/DL — SIGNIFICANT CHANGE UP (ref 32–36)
MCV RBC AUTO: 84.1 FL — SIGNIFICANT CHANGE UP (ref 80–100)
PLATELET # BLD AUTO: 218 K/UL — SIGNIFICANT CHANGE UP (ref 150–400)
POTASSIUM SERPL-MCNC: 4.4 MMOL/L — SIGNIFICANT CHANGE UP (ref 3.5–5.3)
POTASSIUM SERPL-SCNC: 4.4 MMOL/L — SIGNIFICANT CHANGE UP (ref 3.5–5.3)
PROT SERPL-MCNC: 7.9 GM/DL — SIGNIFICANT CHANGE UP (ref 6–8.3)
RBC # BLD: 4.58 M/UL — SIGNIFICANT CHANGE UP (ref 4.2–5.8)
RBC # FLD: 12.8 % — SIGNIFICANT CHANGE UP (ref 10.3–14.5)
SODIUM SERPL-SCNC: 135 MMOL/L — SIGNIFICANT CHANGE UP (ref 135–145)
WBC # BLD: 8.58 K/UL — SIGNIFICANT CHANGE UP (ref 3.8–10.5)
WBC # FLD AUTO: 8.58 K/UL — SIGNIFICANT CHANGE UP (ref 3.8–10.5)

## 2025-07-27 PROCEDURE — 99285 EMERGENCY DEPT VISIT HI MDM: CPT

## 2025-07-27 RX ORDER — ONDANSETRON HCL/PF 4 MG/2 ML
4 VIAL (ML) INJECTION ONCE
Refills: 0 | Status: COMPLETED | OUTPATIENT
Start: 2025-07-27 | End: 2025-07-27

## 2025-07-27 RX ORDER — CEFTRIAXONE 500 MG/1
1000 INJECTION, POWDER, FOR SOLUTION INTRAMUSCULAR; INTRAVENOUS ONCE
Refills: 0 | Status: DISCONTINUED | OUTPATIENT
Start: 2025-07-27 | End: 2025-07-27

## 2025-07-27 RX ORDER — ACETAMINOPHEN 500 MG/5ML
1000 LIQUID (ML) ORAL ONCE
Refills: 0 | Status: COMPLETED | OUTPATIENT
Start: 2025-07-27 | End: 2025-07-27

## 2025-07-27 RX ORDER — CEFTRIAXONE 500 MG/1
1000 INJECTION, POWDER, FOR SOLUTION INTRAMUSCULAR; INTRAVENOUS ONCE
Refills: 0 | Status: COMPLETED | OUTPATIENT
Start: 2025-07-27 | End: 2025-07-27

## 2025-07-27 RX ADMIN — Medication 1000 MILLILITER(S): at 22:05

## 2025-07-27 RX ADMIN — Medication 4 MILLIGRAM(S): at 22:05

## 2025-07-27 RX ADMIN — Medication 20 MILLIGRAM(S): at 22:05

## 2025-07-27 NOTE — ED PROVIDER NOTE - NSFOLLOWUPINSTRUCTIONS_ED_ALL_ED_FT
You were evaluated in the Emergency Department for vomiting.  You were evaluated and examined by a physician, and you had lab testing, CT scan of your abdomen and an ultrasound of your liver/gallbladder.    Based on your evaluation: there are no signs of intestinal infection or gallbladder problems that require surgery.  You could have a virus.    There were some abnormal appearing lymph nodes in your chest on the CT scan - and we recommend you follow up with your primary care doctor for re-evaluation, you should get additional tests/imaging of your chest to verify these lymph nodes return to normal and that there is not another problem not seen on today's visit.       We recommend that you:  1. See your primary care physician within the next 72 hours for follow up.  Bring a copy of your discharge paperwork (including any test results) to your doctor.  Bring a copy of your test results (especially your CT results) to your doctor.  2. Take zofran (4mg dissolving tablet) up to every 8 hours as needed for nausea.      *** Return immediately if you have recurrent symptoms, persistent vomiting not helped by medication, or any other new/concerning symptoms. ***

## 2025-07-27 NOTE — ED PROVIDER NOTE - CLINICAL SUMMARY MEDICAL DECISION MAKING FREE TEXT BOX
5-year-old male with past medical history of insulin-dependent type 2 diabetes, bipolar disorder, developmental delay, hypothyroidism, GERD, presents the emergency department with nausea and vomiting since yesterday, reporting that today he noticed blood-streaked vomitus twice.  He endorses upper abdominal pain, no other reported symptoms.    Physical exam significant for male matching stated age in no apparent distress who is alert and oriented to person time and place.  His abdomen is soft, he has focal tenderness in the right upper quadrant.    Given patient's focal positive abdominal exam, will obtain right upper quadrant, Pepcid trial initially which improved symptoms, however patient still in pain will treat with medications.  Disposition pending imaging findings.

## 2025-07-27 NOTE — ED PROVIDER NOTE - PATIENT PORTAL LINK FT
You can access the FollowMyHealth Patient Portal offered by [facility] by registering at the following website: [email or web address]. By joining [employer]’s FollowMyHealth portal, you will also be able to view your health information using other applications (apps) compatible with our system.

## 2025-07-28 VITALS
OXYGEN SATURATION: 98 % | TEMPERATURE: 98 F | SYSTOLIC BLOOD PRESSURE: 91 MMHG | HEART RATE: 72 BPM | RESPIRATION RATE: 16 BRPM | DIASTOLIC BLOOD PRESSURE: 58 MMHG

## 2025-07-28 LAB
ANISOCYTOSIS BLD QL: SLIGHT — SIGNIFICANT CHANGE UP
APPEARANCE UR: CLEAR — SIGNIFICANT CHANGE UP
APTT BLD: 30.2 SEC — SIGNIFICANT CHANGE UP (ref 26.1–36.8)
BASOPHILS # BLD AUTO: 0 K/UL — SIGNIFICANT CHANGE UP (ref 0–0.2)
BASOPHILS NFR BLD AUTO: 0 % — SIGNIFICANT CHANGE UP (ref 0–2)
BILIRUB UR-MCNC: NEGATIVE — SIGNIFICANT CHANGE UP
COLOR SPEC: YELLOW — SIGNIFICANT CHANGE UP
DIFF PNL FLD: NEGATIVE — SIGNIFICANT CHANGE UP
EOSINOPHIL # BLD AUTO: 0 K/UL — SIGNIFICANT CHANGE UP (ref 0–0.5)
EOSINOPHIL NFR BLD AUTO: 0 % — SIGNIFICANT CHANGE UP (ref 0–6)
GLUCOSE UR QL: NEGATIVE MG/DL — SIGNIFICANT CHANGE UP
INR BLD: 1.28 RATIO — HIGH (ref 0.85–1.16)
KETONES UR QL: NEGATIVE MG/DL — SIGNIFICANT CHANGE UP
LACTATE SERPL-SCNC: 1.4 MMOL/L — SIGNIFICANT CHANGE UP (ref 0.7–2)
LEUKOCYTE ESTERASE UR-ACNC: NEGATIVE — SIGNIFICANT CHANGE UP
LYMPHOCYTES # BLD AUTO: 0.94 K/UL — LOW (ref 1–3.3)
LYMPHOCYTES # BLD AUTO: 11 % — LOW (ref 13–44)
MANUAL SMEAR VERIFICATION: SIGNIFICANT CHANGE UP
MICROCYTES BLD QL: SLIGHT — SIGNIFICANT CHANGE UP
MONOCYTES # BLD AUTO: 0.94 K/UL — HIGH (ref 0–0.9)
MONOCYTES NFR BLD AUTO: 11 % — SIGNIFICANT CHANGE UP (ref 2–14)
NEUTROPHILS # BLD AUTO: 6.69 K/UL — SIGNIFICANT CHANGE UP (ref 1.8–7.4)
NEUTROPHILS NFR BLD AUTO: 76 % — SIGNIFICANT CHANGE UP (ref 43–77)
NEUTS BAND # BLD: 2 % — SIGNIFICANT CHANGE UP (ref 0–8)
NEUTS BAND NFR BLD: 2 % — SIGNIFICANT CHANGE UP (ref 0–8)
NEUTS VAC BLD QL SMEAR: SLIGHT — SIGNIFICANT CHANGE UP
NITRITE UR-MCNC: NEGATIVE — SIGNIFICANT CHANGE UP
NRBC # BLD: 0 /100 WBCS — SIGNIFICANT CHANGE UP (ref 0–0)
NRBC BLD AUTO-RTO: SIGNIFICANT CHANGE UP /100 WBCS (ref 0–0)
NRBC BLD-RTO: 0 /100 WBCS — SIGNIFICANT CHANGE UP (ref 0–0)
PH UR: 8 — SIGNIFICANT CHANGE UP (ref 5–8)
PLAT MORPH BLD: NORMAL — SIGNIFICANT CHANGE UP
PROT UR-MCNC: NEGATIVE MG/DL — SIGNIFICANT CHANGE UP
PROTHROM AB SERPL-ACNC: 14.8 SEC — HIGH (ref 9.9–13.4)
RBC BLD AUTO: ABNORMAL
SP GR SPEC: 1.02 — SIGNIFICANT CHANGE UP (ref 1–1.03)
UROBILINOGEN FLD QL: 1 MG/DL — SIGNIFICANT CHANGE UP (ref 0.2–1)

## 2025-07-28 PROCEDURE — 76705 ECHO EXAM OF ABDOMEN: CPT | Mod: 26

## 2025-07-28 PROCEDURE — 74177 CT ABD & PELVIS W/CONTRAST: CPT | Mod: 26

## 2025-07-28 PROCEDURE — 93010 ELECTROCARDIOGRAM REPORT: CPT

## 2025-07-28 RX ORDER — ONDANSETRON HCL/PF 4 MG/2 ML
1 VIAL (ML) INJECTION
Qty: 1 | Refills: 0
Start: 2025-07-28 | End: 2025-07-30

## 2025-07-28 RX ADMIN — Medication 1850 MILLILITER(S): at 01:47

## 2025-07-28 RX ADMIN — Medication 400 MILLIGRAM(S): at 01:48

## 2025-07-28 RX ADMIN — CEFTRIAXONE 1000 MILLIGRAM(S): 500 INJECTION, POWDER, FOR SOLUTION INTRAMUSCULAR; INTRAVENOUS at 01:47

## 2025-07-28 NOTE — ED ADULT NURSE REASSESSMENT NOTE - NS ED NURSE REASSESS COMMENT FT1
Pt off unit for imaging. Unable to obtain VS and blood work at this time Pt off unit for imaging. Unable to obtain VS, EKG and sepsis blood work at this time

## 2025-08-02 LAB
CULTURE RESULTS: SIGNIFICANT CHANGE UP
CULTURE RESULTS: SIGNIFICANT CHANGE UP
SPECIMEN SOURCE: SIGNIFICANT CHANGE UP
SPECIMEN SOURCE: SIGNIFICANT CHANGE UP

## 2025-08-16 ENCOUNTER — EMERGENCY (EMERGENCY)
Facility: HOSPITAL | Age: 35
LOS: 1 days | End: 2025-08-16
Attending: STUDENT IN AN ORGANIZED HEALTH CARE EDUCATION/TRAINING PROGRAM | Admitting: STUDENT IN AN ORGANIZED HEALTH CARE EDUCATION/TRAINING PROGRAM
Payer: MEDICAID

## 2025-08-16 VITALS
HEART RATE: 105 BPM | OXYGEN SATURATION: 98 % | HEIGHT: 60 IN | RESPIRATION RATE: 17 BRPM | TEMPERATURE: 99 F | WEIGHT: 149.91 LBS | DIASTOLIC BLOOD PRESSURE: 63 MMHG | SYSTOLIC BLOOD PRESSURE: 100 MMHG

## 2025-08-16 VITALS
OXYGEN SATURATION: 98 % | DIASTOLIC BLOOD PRESSURE: 72 MMHG | SYSTOLIC BLOOD PRESSURE: 100 MMHG | RESPIRATION RATE: 16 BRPM | TEMPERATURE: 98 F | HEART RATE: 79 BPM

## 2025-08-16 LAB
ALBUMIN SERPL ELPH-MCNC: 3.9 G/DL — SIGNIFICANT CHANGE UP (ref 3.3–5)
ALP SERPL-CCNC: 180 U/L — HIGH (ref 40–120)
ALT FLD-CCNC: 30 U/L — SIGNIFICANT CHANGE UP (ref 4–41)
ANION GAP SERPL CALC-SCNC: 13 MMOL/L — SIGNIFICANT CHANGE UP (ref 7–14)
AST SERPL-CCNC: 38 U/L — SIGNIFICANT CHANGE UP (ref 4–40)
BASOPHILS # BLD AUTO: 0.03 K/UL — SIGNIFICANT CHANGE UP (ref 0–0.2)
BASOPHILS NFR BLD AUTO: 0.3 % — SIGNIFICANT CHANGE UP (ref 0–2)
BILIRUB SERPL-MCNC: 1 MG/DL — SIGNIFICANT CHANGE UP (ref 0.2–1.2)
BUN SERPL-MCNC: 14 MG/DL — SIGNIFICANT CHANGE UP (ref 7–23)
CALCIUM SERPL-MCNC: 8.8 MG/DL — SIGNIFICANT CHANGE UP (ref 8.4–10.5)
CHLORIDE SERPL-SCNC: 97 MMOL/L — LOW (ref 98–107)
CO2 SERPL-SCNC: 21 MMOL/L — LOW (ref 22–31)
CREAT SERPL-MCNC: 0.91 MG/DL — SIGNIFICANT CHANGE UP (ref 0.5–1.3)
EGFR: 113 ML/MIN/1.73M2 — SIGNIFICANT CHANGE UP
EGFR: 113 ML/MIN/1.73M2 — SIGNIFICANT CHANGE UP
EOSINOPHIL # BLD AUTO: 0.09 K/UL — SIGNIFICANT CHANGE UP (ref 0–0.5)
EOSINOPHIL NFR BLD AUTO: 1 % — SIGNIFICANT CHANGE UP (ref 0–6)
GLUCOSE SERPL-MCNC: 139 MG/DL — HIGH (ref 70–99)
HCT VFR BLD CALC: 36.1 % — LOW (ref 39–50)
HGB BLD-MCNC: 12.3 G/DL — LOW (ref 13–17)
IMM GRANULOCYTES # BLD AUTO: 0.02 K/UL — SIGNIFICANT CHANGE UP (ref 0–0.07)
IMM GRANULOCYTES NFR BLD AUTO: 0.2 % — SIGNIFICANT CHANGE UP (ref 0–0.9)
LYMPHOCYTES # BLD AUTO: 1.34 K/UL — SIGNIFICANT CHANGE UP (ref 1–3.3)
LYMPHOCYTES NFR BLD AUTO: 14.4 % — SIGNIFICANT CHANGE UP (ref 13–44)
MCHC RBC-ENTMCNC: 28.7 PG — SIGNIFICANT CHANGE UP (ref 27–34)
MCHC RBC-ENTMCNC: 34.1 G/DL — SIGNIFICANT CHANGE UP (ref 32–36)
MCV RBC AUTO: 84.3 FL — SIGNIFICANT CHANGE UP (ref 80–100)
MONOCYTES # BLD AUTO: 1.21 K/UL — HIGH (ref 0–0.9)
MONOCYTES NFR BLD AUTO: 13 % — SIGNIFICANT CHANGE UP (ref 2–14)
NEUTROPHILS # BLD AUTO: 6.61 K/UL — SIGNIFICANT CHANGE UP (ref 1.8–7.4)
NEUTROPHILS NFR BLD AUTO: 71.1 % — SIGNIFICANT CHANGE UP (ref 43–77)
NRBC # BLD AUTO: 0 K/UL — SIGNIFICANT CHANGE UP (ref 0–0)
NRBC # FLD: 0 K/UL — SIGNIFICANT CHANGE UP (ref 0–0)
NRBC BLD AUTO-RTO: 0 /100 WBCS — SIGNIFICANT CHANGE UP (ref 0–0)
NT-PROBNP SERPL-SCNC: <36 PG/ML — SIGNIFICANT CHANGE UP
PLATELET # BLD AUTO: 232 K/UL — SIGNIFICANT CHANGE UP (ref 150–400)
PMV BLD: 11.5 FL — SIGNIFICANT CHANGE UP (ref 7–13)
POTASSIUM SERPL-MCNC: 4 MMOL/L — SIGNIFICANT CHANGE UP (ref 3.5–5.3)
POTASSIUM SERPL-SCNC: 4 MMOL/L — SIGNIFICANT CHANGE UP (ref 3.5–5.3)
PROT SERPL-MCNC: 7.5 G/DL — SIGNIFICANT CHANGE UP (ref 6–8.3)
RBC # BLD: 4.28 M/UL — SIGNIFICANT CHANGE UP (ref 4.2–5.8)
RBC # FLD: 12.5 % — SIGNIFICANT CHANGE UP (ref 10.3–14.5)
SODIUM SERPL-SCNC: 131 MMOL/L — LOW (ref 135–145)
TROPONIN T, HIGH SENSITIVITY RESULT: 12 NG/L — SIGNIFICANT CHANGE UP
WBC # BLD: 9.3 K/UL — SIGNIFICANT CHANGE UP (ref 3.8–10.5)
WBC # FLD AUTO: 9.3 K/UL — SIGNIFICANT CHANGE UP (ref 3.8–10.5)

## 2025-08-16 PROCEDURE — 93010 ELECTROCARDIOGRAM REPORT: CPT

## 2025-08-16 PROCEDURE — 71046 X-RAY EXAM CHEST 2 VIEWS: CPT | Mod: 26
